# Patient Record
Sex: FEMALE | HISPANIC OR LATINO | URBAN - METROPOLITAN AREA
[De-identification: names, ages, dates, MRNs, and addresses within clinical notes are randomized per-mention and may not be internally consistent; named-entity substitution may affect disease eponyms.]

---

## 2018-10-31 ENCOUNTER — OFFICE VISIT (OUTPATIENT)
Dept: URBAN - METROPOLITAN AREA CLINIC 11 | Facility: CLINIC | Age: 58
End: 2018-10-31

## 2018-10-31 DIAGNOSIS — H52.4 PRESBYOPIA: Primary | ICD-10-CM

## 2018-10-31 PROCEDURE — 92002 INTRM OPH EXAM NEW PATIENT: CPT | Performed by: OPTOMETRIST

## 2018-10-31 ASSESSMENT — VISUAL ACUITY
OD: 20/20
OS: 20/20

## 2018-10-31 ASSESSMENT — INTRAOCULAR PRESSURE
OD: 17
OS: 16

## 2019-10-18 ENCOUNTER — OFFICE VISIT (OUTPATIENT)
Dept: FAMILY MEDICINE CLINIC | Facility: CLINIC | Age: 59
End: 2019-10-18
Payer: COMMERCIAL

## 2019-10-18 VITALS
HEART RATE: 76 BPM | BODY MASS INDEX: 39.37 KG/M2 | WEIGHT: 225 LBS | OXYGEN SATURATION: 98 % | TEMPERATURE: 98 F | DIASTOLIC BLOOD PRESSURE: 74 MMHG | SYSTOLIC BLOOD PRESSURE: 134 MMHG | HEIGHT: 63.5 IN

## 2019-10-18 DIAGNOSIS — J01.20 ACUTE NON-RECURRENT ETHMOIDAL SINUSITIS: Primary | ICD-10-CM

## 2019-10-18 DIAGNOSIS — Z12.39 BREAST CANCER SCREENING: ICD-10-CM

## 2019-10-18 DIAGNOSIS — J20.9 ACUTE BRONCHITIS, UNSPECIFIED ORGANISM: ICD-10-CM

## 2019-10-18 DIAGNOSIS — Z00.00 LABORATORY EXAMINATION ORDERED AS PART OF A ROUTINE GENERAL MEDICAL EXAMINATION: ICD-10-CM

## 2019-10-18 DIAGNOSIS — Z98.890 HISTORY OF RIGHT BREAST BIOPSY: ICD-10-CM

## 2019-10-18 DIAGNOSIS — F17.200 SMOKER: ICD-10-CM

## 2019-10-18 PROCEDURE — 99213 OFFICE O/P EST LOW 20 MIN: CPT | Performed by: FAMILY MEDICINE

## 2019-10-18 RX ORDER — PROMETHAZINE HYDROCHLORIDE AND CODEINE PHOSPHATE 6.25; 1 MG/5ML; MG/5ML
5 SYRUP ORAL EVERY 4 HOURS PRN
Qty: 240 ML | Refills: 0 | Status: SHIPPED | OUTPATIENT
Start: 2019-10-18 | End: 2020-06-22

## 2019-10-18 RX ORDER — AZITHROMYCIN 250 MG/1
TABLET, FILM COATED ORAL
Qty: 6 TABLET | Refills: 0 | Status: SHIPPED | OUTPATIENT
Start: 2019-10-18 | End: 2019-10-23

## 2019-10-18 NOTE — PROGRESS NOTES
HPI:   Saba Ramey is a 66year old female who presents for upper respiratory symptoms for  17  days. Patient reports dry cough, prior history of bronchitis, denies fever, denies sinus pain.   Smoker non compliant with follow ups has feelings of apprehe denies GI symptoms  NEURO: denies headache/weakness    EXAM:   /74 (BP Location: Right arm, Patient Position: Sitting, Cuff Size: adult)   Pulse 76   Temp 98.3 °F (36.8 °C) (Oral)   Ht 63.5\"   Wt 225 lb (102.1 kg)   LMP 01/01/2016 (Within Months) Take 5 mL by mouth every 4 (four) hours as needed for cough. Dispense: 240 mL; Refill: 0    3. Laboratory examination ordered as part of a routine general medical examination  - CBC WITH DIFFERENTIAL WITH PLATELET;  Future  - COMP METABOLIC PANEL (14); Fut them.  Track your triggers  What gives you that “N-mpmz-q-cigarette” feeling? List all the situations that make you want a cigarette. Then think of other ways to deal with these situations.  Here are some examples:  Situation How I'll handle it   Finishing

## 2019-10-19 ENCOUNTER — LAB ENCOUNTER (OUTPATIENT)
Dept: LAB | Age: 59
End: 2019-10-19
Attending: FAMILY MEDICINE
Payer: COMMERCIAL

## 2019-10-19 DIAGNOSIS — Z00.00 LABORATORY EXAMINATION ORDERED AS PART OF A ROUTINE GENERAL MEDICAL EXAMINATION: ICD-10-CM

## 2019-10-19 PROCEDURE — 80061 LIPID PANEL: CPT | Performed by: FAMILY MEDICINE

## 2019-10-19 PROCEDURE — 36415 COLL VENOUS BLD VENIPUNCTURE: CPT | Performed by: FAMILY MEDICINE

## 2019-10-19 PROCEDURE — 80050 GENERAL HEALTH PANEL: CPT | Performed by: FAMILY MEDICINE

## 2019-10-29 ENCOUNTER — OFFICE VISIT (OUTPATIENT)
Dept: FAMILY MEDICINE CLINIC | Facility: CLINIC | Age: 59
End: 2019-10-29
Payer: COMMERCIAL

## 2019-10-29 VITALS
HEART RATE: 84 BPM | HEIGHT: 63.5 IN | DIASTOLIC BLOOD PRESSURE: 78 MMHG | WEIGHT: 225 LBS | SYSTOLIC BLOOD PRESSURE: 102 MMHG | BODY MASS INDEX: 39.37 KG/M2

## 2019-10-29 DIAGNOSIS — D05.12 DUCTAL CARCINOMA IN SITU (DCIS) OF BOTH BREASTS: ICD-10-CM

## 2019-10-29 DIAGNOSIS — F17.200 SMOKER: ICD-10-CM

## 2019-10-29 DIAGNOSIS — Z12.11 COLON CANCER SCREENING: ICD-10-CM

## 2019-10-29 DIAGNOSIS — E66.9 OBESITY (BMI 35.0-39.9 WITHOUT COMORBIDITY): ICD-10-CM

## 2019-10-29 DIAGNOSIS — D05.11 DUCTAL CARCINOMA IN SITU (DCIS) OF BOTH BREASTS: ICD-10-CM

## 2019-10-29 DIAGNOSIS — Z01.419 WELL FEMALE EXAM WITH ROUTINE GYNECOLOGICAL EXAM: Primary | ICD-10-CM

## 2019-10-29 DIAGNOSIS — Z12.4 SCREENING FOR MALIGNANT NEOPLASM OF CERVIX: ICD-10-CM

## 2019-10-29 DIAGNOSIS — Z13.820 OSTEOPOROSIS SCREENING: ICD-10-CM

## 2019-10-29 DIAGNOSIS — Z98.890 HISTORY OF RIGHT BREAST BIOPSY: ICD-10-CM

## 2019-10-29 PROCEDURE — 88175 CYTOPATH C/V AUTO FLUID REDO: CPT | Performed by: FAMILY MEDICINE

## 2019-10-29 PROCEDURE — 99396 PREV VISIT EST AGE 40-64: CPT | Performed by: FAMILY MEDICINE

## 2019-10-29 PROCEDURE — 87624 HPV HI-RISK TYP POOLED RSLT: CPT | Performed by: FAMILY MEDICINE

## 2019-10-29 NOTE — PROGRESS NOTES
HPI:   Jose G Sidhu is a 66year old female who presents for a complete physical exam.  Here to review labs. Symptoms: denies discharge, itching, burning or dysuria. 4-5 years ago Menopause.    Cough and upper respiratory infection symptoms have resolve    PLAN: I reviewed both breast conservation, which would include excision of both of these areas, versus mastectomy with reconstruction.  I suggested that we obtain a consultation with the genetic counselor at the [address] [place] [address] 0.1 - 2.0 mg/dL    Total Protein 7.0 6.4 - 8.2 g/dL    Albumin 3.4 3.4 - 5.0 g/dL    Globulin  3.6 2.8 - 4.4 g/dL    A/G Ratio 0.9 (L) 1.0 - 2.0   CBC W/ DIFFERENTIAL   Result Value Ref Range    WBC 5.5 4.0 - 11.0 x10(3) uL    RBC 4.08 3.80 - 5.30 x10(6)uL any unusual skin lesions or rashes  EYES:denies vision changes  HEENT: denies upper respiratory symptoms  LUNGS: denies cough or shortness of breath with exertion  CHEST:  denies breast changes or pain  CARDIOVASCULAR: denies chest pain or tightness on exe for a complete physical exam.   Well female exam with routine gynecological exam  (primary encounter diagnosis)  Smoker  History of right breast biopsy  Colon cancer screening  Osteoporosis screening  Screening for malignant neoplasm of cervix  Obesity (bm patient did not verbalize that she had breast cancer during office visit she stated that her biopsy was normal upon review of results from 2016 with Dr. Juani Ballard it did show DCIS patient will be notified of this test results and referred to [facility][note 3]

## 2019-10-30 ENCOUNTER — TELEPHONE (OUTPATIENT)
Dept: FAMILY MEDICINE CLINIC | Facility: CLINIC | Age: 59
End: 2019-10-30

## 2019-10-30 ENCOUNTER — TELEPHONE (OUTPATIENT)
Dept: SURGERY | Facility: CLINIC | Age: 59
End: 2019-10-30

## 2019-10-30 NOTE — TELEPHONE ENCOUNTER
Received call from office of Gabby Reynolds. Advised to have patient get her diagnostic mammogram, and we will see her after that for management. Questions addressed.

## 2019-10-30 NOTE — TELEPHONE ENCOUNTER
Review of chart after patient's physical on 10/29/19 revealed patient did not follow up after having abnormal biopsy of bilateral breasts showing ductal carcinoma in situ in 2016.   Patient had been referred by the breast cancer coordinator to Dr. Michelle nash

## 2019-10-30 NOTE — TELEPHONE ENCOUNTER
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

## 2019-10-30 NOTE — TELEPHONE ENCOUNTER
Dot Hanna, the [facility], was able to offer aDnish Pratt an earlier appointment date on a Friday, which was one of her available days of the week, so the Mammogram was re-scheduled for:     Future Appointments   Date Time Provider Nayely Tapia

## 2019-11-08 ENCOUNTER — TELEPHONE (OUTPATIENT)
Dept: FAMILY MEDICINE CLINIC | Facility: CLINIC | Age: 59
End: 2019-11-08

## 2019-11-08 ENCOUNTER — TELEPHONE (OUTPATIENT)
Dept: SURGERY | Facility: CLINIC | Age: 59
End: 2019-11-08

## 2019-11-08 ENCOUNTER — HOSPITAL ENCOUNTER (OUTPATIENT)
Dept: ULTRASOUND IMAGING | Age: 59
Discharge: HOME | End: 2019-11-08
Attending: FAMILY MEDICINE
Payer: COMMERCIAL

## 2019-11-08 ENCOUNTER — HOSPITAL ENCOUNTER (OUTPATIENT)
Dept: MAMMOGRAPHY | Age: 59
Discharge: HOME | End: 2019-11-08
Attending: FAMILY MEDICINE
Payer: COMMERCIAL

## 2019-11-08 DIAGNOSIS — D05.11 DUCTAL CARCINOMA IN SITU (DCIS) OF BOTH BREASTS: ICD-10-CM

## 2019-11-08 DIAGNOSIS — D05.12 DUCTAL CARCINOMA IN SITU (DCIS) OF BOTH BREASTS: ICD-10-CM

## 2019-11-08 PROCEDURE — 76642 ULTRASOUND BREAST LIMITED: CPT | Performed by: FAMILY MEDICINE

## 2019-11-08 PROCEDURE — 77062 BREAST TOMOSYNTHESIS BI: CPT | Performed by: FAMILY MEDICINE

## 2019-11-08 PROCEDURE — 77066 DX MAMMO INCL CAD BI: CPT | Performed by: FAMILY MEDICINE

## 2019-11-08 NOTE — TELEPHONE ENCOUNTER
Ania from Dr Augustina Rodriguez office is calling to let Thom Morales know that she got a hold of Irina Churchill to let her know that they can get her in for a appt, but Irina Churchill is refusing the appt, Melissa Reyes did call Irina Churchill back to let her know that they will be holding a spot for h

## 2019-11-08 NOTE — IMAGING NOTE
This Breast Care RN spoke with Adore todd surgical consult for a 3 site bilateral ultrasound guided breast biopsy recommendation by Dr. Izzy Connor . Procedure reviewed and all questions answered.  On the day of the biopsy, pt instructed to take Tylen

## 2019-11-08 NOTE — TELEPHONE ENCOUNTER
I contacted the patient to offer an appt to see Dr Adrián Worley in consultation on Tuesday, after her mammogram which is today. Pt doesn't want to schedule at this time. Asked we call her on Monday.

## 2019-11-08 NOTE — TELEPHONE ENCOUNTER
Per Marcus Leyva PA-C, an attempt was made to contact the patient, but there was no answer and her voicemail was not set up.

## 2019-11-09 ENCOUNTER — PATIENT MESSAGE (OUTPATIENT)
Dept: FAMILY MEDICINE CLINIC | Facility: CLINIC | Age: 59
End: 2019-11-09

## 2019-11-09 NOTE — PROGRESS NOTES
Please call patient with test results on Monday she is already aware of the breast cancer I would like her to keep the appointment already established at [facility] with [address] for Tuesday.   Please explained that she would be the best person to buddy

## 2019-11-09 NOTE — TELEPHONE ENCOUNTER
Call patient Monday she needs to keep appointment due to abnormal mammogram I want her to be seen by oncology

## 2019-11-11 ENCOUNTER — TELEPHONE (OUTPATIENT)
Dept: FAMILY MEDICINE CLINIC | Facility: CLINIC | Age: 59
End: 2019-11-11

## 2019-11-11 NOTE — TELEPHONE ENCOUNTER
Per Allie Rushing PA-C, another message was left on the patient's voicemail asking her to call back to go over her recent results.

## 2019-11-11 NOTE — TELEPHONE ENCOUNTER
The patient was contacted regarding her Mammogram results from 11/08/2019.   It was explained that Malcolm Asif would like her to see Jocelyn Esparza M.D., a Surgical Oncologist, to discuss her history of DCIS in the right breast and recent mammogram results,

## 2019-11-11 NOTE — TELEPHONE ENCOUNTER
From: Miriam Bedoya  To: Missy Good PA-C  Sent: 11/9/2019 10:46 AM CST  Subject: Test Results Question    Dr John Paul Ring, when you have time, could you please call me @ [phone] regarding the mammogram Results ? I have a few questions too.   Thank

## 2019-11-11 NOTE — TELEPHONE ENCOUNTER
Per Jeanne Kat PA-C, a message was left on the patient's voicemail asking her to call back to go over her recent results.

## 2019-11-11 NOTE — TELEPHONE ENCOUNTER
Adrian Staff at Dr. Júnior Light office called back to inform us that they were able to schedule her an appointment for a consultation on:    Future Appointments   Date Time Provider Nayely Cantu   11/26/2019 12:00 PM Sherill Peabody, MD [facility] Surg

## 2019-11-11 NOTE — TELEPHONE ENCOUNTER
Jeffrey Fields from Dr. Guillen Filler office called and wants to speak to you regarding this pt. She can be reached at [phone].

## 2019-11-12 ENCOUNTER — TELEPHONE (OUTPATIENT)
Dept: SURGERY | Facility: CLINIC | Age: 59
End: 2019-11-12

## 2019-11-12 NOTE — TELEPHONE ENCOUNTER
LVM for patient identifying myself, and asked her to call me back, to go over a few things on her recent mammogram.   Confirmed that we are all set for her consultation on 11/26.

## 2019-11-26 ENCOUNTER — OFFICE VISIT (OUTPATIENT)
Dept: SURGERY | Facility: CLINIC | Age: 59
End: 2019-11-26
Payer: COMMERCIAL

## 2019-11-26 VITALS
HEART RATE: 76 BPM | OXYGEN SATURATION: 100 % | RESPIRATION RATE: 16 BRPM | DIASTOLIC BLOOD PRESSURE: 93 MMHG | SYSTOLIC BLOOD PRESSURE: 156 MMHG

## 2019-11-26 DIAGNOSIS — D05.11 NEOPLASM OF RIGHT BREAST, PRIMARY TUMOR STAGING CATEGORY TIS: DUCTAL CARCINOMA IN SITU (DCIS): ICD-10-CM

## 2019-11-26 DIAGNOSIS — R92.1 BREAST CALCIFICATION, RIGHT: Primary | ICD-10-CM

## 2019-11-26 DIAGNOSIS — N63.20 LEFT BREAST MASS: ICD-10-CM

## 2019-11-26 PROCEDURE — 99245 OFF/OP CONSLTJ NEW/EST HI 55: CPT | Performed by: SURGERY

## 2019-12-10 ENCOUNTER — HOSPITAL ENCOUNTER (OUTPATIENT)
Dept: MAMMOGRAPHY | Facility: HOSPITAL | Age: 59
Discharge: HOME | End: 2019-12-10
Attending: SURGERY
Payer: COMMERCIAL

## 2019-12-10 DIAGNOSIS — D05.11 NEOPLASM OF RIGHT BREAST, PRIMARY TUMOR STAGING CATEGORY TIS: DUCTAL CARCINOMA IN SITU (DCIS): ICD-10-CM

## 2019-12-10 DIAGNOSIS — N63.20 LEFT BREAST MASS: ICD-10-CM

## 2019-12-10 DIAGNOSIS — R92.1 BREAST CALCIFICATION, RIGHT: ICD-10-CM

## 2019-12-10 PROCEDURE — 88360 TUMOR IMMUNOHISTOCHEM/MANUAL: CPT | Performed by: SURGERY

## 2019-12-10 PROCEDURE — 19083 BX BREAST 1ST LESION US IMAG: CPT | Performed by: SURGERY

## 2019-12-10 PROCEDURE — 19082 BX BREAST ADD LESION STRTCTC: CPT | Performed by: SURGERY

## 2019-12-10 PROCEDURE — 88305 TISSUE EXAM BY PATHOLOGIST: CPT | Performed by: SURGERY

## 2019-12-10 PROCEDURE — 88342 IMHCHEM/IMCYTCHM 1ST ANTB: CPT | Performed by: SURGERY

## 2019-12-10 PROCEDURE — 19081 BX BREAST 1ST LESION STRTCTC: CPT | Performed by: SURGERY

## 2019-12-19 ENCOUNTER — TELEPHONE (OUTPATIENT)
Dept: SURGERY | Facility: CLINIC | Age: 59
End: 2019-12-19

## 2019-12-19 NOTE — TELEPHONE ENCOUNTER
I contacted the patient to review the message sent to Dr Keila Kimball regarding the recent biopsies. We discussed that invasive cancer cells were found, and we wanted to set up another appt to discuss surgical management. Pt verbalized understanding.    Pt as

## 2020-01-01 NOTE — PROGRESS NOTES
Breast Surgery New Patient Consultation    This is the first visit for this 64year old woman, referred by Akhil Killian, who presents for evaluation of R DCIS and abnormal L breast imaging.     History of Present Illness:   Ms. Alycia Madden is a 64 y Penicillin G            NAUSEA AND VOMITING    Family History:   Family History   Problem Relation Age of Onset   • Breast Cancer Mother [address] [facility]   • Breast Cancer Maternal Aunt         aunt, maternal side       She is not of Ashkenazi Gnosticism ancestry.     So to get up at night to urinate, urinary hesitancy or retaining urine, painful urination, urinary incontinence, decreased urine stream, blood in the urine or vaginal/penile discharge.     Skin:    The patient denies rash, itching, skin lesions, dry skin, nguyen symmetrical with a cup size 42D. Right breast: The skin, nipple ,and areola appear normal. There is no skin dimpling with movement of the pectoralis. There is no nipple retraction. No nipple discharge can be elicited. The parenchyma is mildly nodular.  The breast conservation is elected, I explained the need for free margins, the  possibility of re-excision to achieve free margins, and the need for post-operative radiotherapy.  The patient was told that antonio staging is not usually required for DCIS, but is s

## 2020-01-03 ENCOUNTER — OFFICE VISIT (OUTPATIENT)
Dept: SURGERY | Facility: CLINIC | Age: 60
End: 2020-01-03
Payer: COMMERCIAL

## 2020-01-03 VITALS
DIASTOLIC BLOOD PRESSURE: 84 MMHG | SYSTOLIC BLOOD PRESSURE: 140 MMHG | WEIGHT: 224.38 LBS | HEART RATE: 66 BPM | BODY MASS INDEX: 39 KG/M2 | TEMPERATURE: 97 F

## 2020-01-03 DIAGNOSIS — Z17.0 MALIGNANT NEOPLASM OF UPPER-INNER QUADRANT OF LEFT BREAST IN FEMALE, ESTROGEN RECEPTOR POSITIVE (HCC): ICD-10-CM

## 2020-01-03 DIAGNOSIS — C50.212 MALIGNANT NEOPLASM OF UPPER-INNER QUADRANT OF LEFT BREAST IN FEMALE, ESTROGEN RECEPTOR POSITIVE (HCC): ICD-10-CM

## 2020-01-03 DIAGNOSIS — D05.11 NEOPLASM OF RIGHT BREAST, PRIMARY TUMOR STAGING CATEGORY TIS: DUCTAL CARCINOMA IN SITU (DCIS): Primary | ICD-10-CM

## 2020-01-03 PROCEDURE — 99215 OFFICE O/P EST HI 40 MIN: CPT | Performed by: SURGERY

## 2020-01-04 NOTE — PROGRESS NOTES
Breast Surgery Surveillance    History of Present Illness:   Ms. Priscille Kehr is a 64year old woman who presents with a personal h/o R breast DCIS that was diagnosed in 2016 for which she chose not to undergo any management.  She states that she did not ago.  She denies infertility treatment to achieve pregnancy.     Medications:    No outpatient medications have been marked as taking for the 1/3/20 encounter (Office Visit) with Shani Moura MD.      Allergies:      Penicillin G            NAUSEA AND dark or bloody stools, constipation, yellowing of the skin, indigestion, nausea, change in bowel habits, diarrhea, abdominal pain or vomiting blood.      Genitourinary:  The patient denies frequent urination, needing to get up at night to urinate, urinary h trachea is in the midline. Conjunctiva are clear, non-icteric. Chest: The chest expands symmetrically. The lungs are clear to auscultation. Heart: The rhythm is regular. There are no murmurs, rubs, gallops or thrills.     Breasts:  Her breasts are sy of care for a multicentric disease is a mastectomy. The natural history and evolution of DCIS was discussed with Ms. Gideon Shone and her family.  This  included the difference between in-situ and invasive carcinoma, and the distinction between  local a described, including the technique, risks and benefits of sentinel node  biopsy, the possible need for axillary dissection, and the long-term sequelae of this procedure.   With regard to systemic therapy, final recommendation will be made following receipt appointment. This encounter lasted a total of 45 minutes, more than 50% of which was dedicated to discussion of management options.

## 2020-01-09 ENCOUNTER — NURSE NAVIGATOR ENCOUNTER (OUTPATIENT)
Dept: HEMATOLOGY/ONCOLOGY | Facility: HOSPITAL | Age: 60
End: 2020-01-09

## 2020-01-09 NOTE — PROGRESS NOTES
LVM for patient to introduce myself as the breast program navigator to ensure that she does not need any assistance at this time. Direct contact information provided.

## 2020-01-10 ENCOUNTER — TELEPHONE (OUTPATIENT)
Dept: SURGERY | Facility: CLINIC | Age: 60
End: 2020-01-10

## 2020-01-13 ENCOUNTER — TELEPHONE (OUTPATIENT)
Dept: SURGERY | Facility: CLINIC | Age: 60
End: 2020-01-13

## 2020-01-13 DIAGNOSIS — Z17.0 MALIGNANT NEOPLASM OF UPPER-INNER QUADRANT OF LEFT BREAST IN FEMALE, ESTROGEN RECEPTOR POSITIVE (HCC): ICD-10-CM

## 2020-01-13 DIAGNOSIS — C50.212 MALIGNANT NEOPLASM OF UPPER-INNER QUADRANT OF LEFT BREAST IN FEMALE, ESTROGEN RECEPTOR POSITIVE (HCC): ICD-10-CM

## 2020-01-13 DIAGNOSIS — D05.11 NEOPLASM OF RIGHT BREAST, PRIMARY TUMOR STAGING CATEGORY TIS: DUCTAL CARCINOMA IN SITU (DCIS): Primary | ICD-10-CM

## 2020-01-13 NOTE — TELEPHONE ENCOUNTER
Pt returned the call regarding scheduling her surgery. Pt wants a Thursday at [address] [facility] scheduled for Feb 6, 2020. Questions addressed regarding what to expect post op, and time off.    She has a trip end of Feb.   Questions addressed related to Health Care Proxy (HCP)

## 2020-01-24 RX ORDER — CLINDAMYCIN PHOSPHATE 900 MG/50ML
900 INJECTION INTRAVENOUS ONCE
Status: CANCELLED | OUTPATIENT
Start: 2020-01-24 | End: 2020-01-24

## 2020-02-05 NOTE — IMAGING NOTE
Phoned Saba Ramey regarding SLN mapping and needle localization process of breast for lumpectomy scheduled for 2/6 with Dr. Kim Foster. Procedure explained and all questions answered.  Pt to be transported via W/C through [facility] and [address] to [facility] in

## 2020-02-06 ENCOUNTER — HOSPITAL ENCOUNTER (OUTPATIENT)
Dept: NUCLEAR MEDICINE | Facility: HOSPITAL | Age: 60
Discharge: HOME | End: 2020-02-06
Attending: SURGERY
Payer: COMMERCIAL

## 2020-02-06 ENCOUNTER — ANESTHESIA EVENT (OUTPATIENT)
Dept: SURGERY | Facility: HOSPITAL | Age: 60
End: 2020-02-06
Payer: COMMERCIAL

## 2020-02-06 ENCOUNTER — HOSPITAL ENCOUNTER (OUTPATIENT)
Facility: HOSPITAL | Age: 60
Setting detail: HOSPITAL OUTPATIENT SURGERY
Discharge: HOME | End: 2020-02-06
Attending: SURGERY | Admitting: SURGERY
Payer: COMMERCIAL

## 2020-02-06 ENCOUNTER — ANESTHESIA (OUTPATIENT)
Dept: SURGERY | Facility: HOSPITAL | Age: 60
End: 2020-02-06
Payer: COMMERCIAL

## 2020-02-06 ENCOUNTER — HOSPITAL ENCOUNTER (OUTPATIENT)
Dept: MAMMOGRAPHY | Facility: HOSPITAL | Age: 60
Discharge: HOME | End: 2020-02-06
Attending: SURGERY
Payer: COMMERCIAL

## 2020-02-06 VITALS
DIASTOLIC BLOOD PRESSURE: 74 MMHG | RESPIRATION RATE: 14 BRPM | HEART RATE: 81 BPM | TEMPERATURE: 98 F | WEIGHT: 225.31 LBS | BODY MASS INDEX: 38.47 KG/M2 | OXYGEN SATURATION: 98 % | HEIGHT: 64 IN | SYSTOLIC BLOOD PRESSURE: 124 MMHG

## 2020-02-06 DIAGNOSIS — C50.212 MALIGNANT NEOPLASM OF UPPER-INNER QUADRANT OF LEFT BREAST IN FEMALE, ESTROGEN RECEPTOR POSITIVE (HCC): ICD-10-CM

## 2020-02-06 DIAGNOSIS — Z17.0 MALIGNANT NEOPLASM OF UPPER-INNER QUADRANT OF LEFT BREAST IN FEMALE, ESTROGEN RECEPTOR POSITIVE (HCC): ICD-10-CM

## 2020-02-06 DIAGNOSIS — D05.11 NEOPLASM OF RIGHT BREAST, PRIMARY TUMOR STAGING CATEGORY TIS: DUCTAL CARCINOMA IN SITU (DCIS): ICD-10-CM

## 2020-02-06 PROCEDURE — 76098 X-RAY EXAM SURGICAL SPECIMEN: CPT | Performed by: SURGERY

## 2020-02-06 PROCEDURE — 19281 PERQ DEVICE BREAST 1ST IMAG: CPT | Performed by: SURGERY

## 2020-02-06 PROCEDURE — 19282 PERQ DEVICE BREAST EA IMAG: CPT | Performed by: SURGERY

## 2020-02-06 PROCEDURE — 07B60ZX EXCISION OF LEFT AXILLARY LYMPHATIC, OPEN APPROACH, DIAGNOSTIC: ICD-10-PCS | Performed by: SURGERY

## 2020-02-06 PROCEDURE — 88305 TISSUE EXAM BY PATHOLOGIST: CPT | Performed by: SURGERY

## 2020-02-06 PROCEDURE — 36410 VNPNXR 3YR/> PHY/QHP DX/THER: CPT | Performed by: SURGERY

## 2020-02-06 PROCEDURE — 0HBV0ZZ EXCISION OF BILATERAL BREAST, OPEN APPROACH: ICD-10-PCS | Performed by: SURGERY

## 2020-02-06 PROCEDURE — S0077 INJECTION, CLINDAMYCIN PHOSP: HCPCS | Performed by: SURGERY

## 2020-02-06 PROCEDURE — 0HX5XZZ TRANSFER CHEST SKIN, EXTERNAL APPROACH: ICD-10-PCS | Performed by: SURGERY

## 2020-02-06 PROCEDURE — 88307 TISSUE EXAM BY PATHOLOGIST: CPT | Performed by: SURGERY

## 2020-02-06 PROCEDURE — 76937 US GUIDE VASCULAR ACCESS: CPT | Performed by: SURGERY

## 2020-02-06 PROCEDURE — 78195 LYMPH SYSTEM IMAGING: CPT | Performed by: SURGERY

## 2020-02-06 RX ORDER — HYDROCODONE BITARTRATE AND ACETAMINOPHEN 5; 325 MG/1; MG/1
2 TABLET ORAL AS NEEDED
Status: DISCONTINUED | OUTPATIENT
Start: 2020-02-06 | End: 2020-02-06

## 2020-02-06 RX ORDER — LIDOCAINE HYDROCHLORIDE 10 MG/ML
INJECTION, SOLUTION EPIDURAL; INFILTRATION; INTRACAUDAL; PERINEURAL AS NEEDED
Status: DISCONTINUED | OUTPATIENT
Start: 2020-02-06 | End: 2020-02-06 | Stop reason: SURG

## 2020-02-06 RX ORDER — LIDOCAINE HYDROCHLORIDE AND EPINEPHRINE 10; 10 MG/ML; UG/ML
INJECTION, SOLUTION INFILTRATION; PERINEURAL AS NEEDED
Status: DISCONTINUED | OUTPATIENT
Start: 2020-02-06 | End: 2020-02-06 | Stop reason: HOSPADM

## 2020-02-06 RX ORDER — EPHEDRINE SULFATE 50 MG/ML
INJECTION, SOLUTION INTRAVENOUS AS NEEDED
Status: DISCONTINUED | OUTPATIENT
Start: 2020-02-06 | End: 2020-02-06 | Stop reason: SURG

## 2020-02-06 RX ORDER — KETOROLAC TROMETHAMINE 30 MG/ML
INJECTION, SOLUTION INTRAMUSCULAR; INTRAVENOUS AS NEEDED
Status: DISCONTINUED | OUTPATIENT
Start: 2020-02-06 | End: 2020-02-06 | Stop reason: SURG

## 2020-02-06 RX ORDER — NALOXONE HYDROCHLORIDE 0.4 MG/ML
80 INJECTION, SOLUTION INTRAMUSCULAR; INTRAVENOUS; SUBCUTANEOUS AS NEEDED
Status: DISCONTINUED | OUTPATIENT
Start: 2020-02-06 | End: 2020-02-06

## 2020-02-06 RX ORDER — MIDAZOLAM HYDROCHLORIDE 1 MG/ML
INJECTION INTRAMUSCULAR; INTRAVENOUS AS NEEDED
Status: DISCONTINUED | OUTPATIENT
Start: 2020-02-06 | End: 2020-02-06 | Stop reason: SURG

## 2020-02-06 RX ORDER — LIDOCAINE AND PRILOCAINE 25; 25 MG/G; MG/G
CREAM TOPICAL ONCE
Status: COMPLETED | OUTPATIENT
Start: 2020-02-06 | End: 2020-02-06

## 2020-02-06 RX ORDER — ACETAMINOPHEN 500 MG
1000 TABLET ORAL ONCE
Status: DISCONTINUED | OUTPATIENT
Start: 2020-02-06 | End: 2020-02-06 | Stop reason: HOSPADM

## 2020-02-06 RX ORDER — HYDROCODONE BITARTRATE AND ACETAMINOPHEN 5; 325 MG/1; MG/1
1-2 TABLET ORAL EVERY 6 HOURS PRN
Qty: 40 TABLET | Refills: 0 | Status: SHIPPED | OUTPATIENT
Start: 2020-02-06 | End: 2020-03-24 | Stop reason: ALTCHOICE

## 2020-02-06 RX ORDER — BUPIVACAINE HYDROCHLORIDE 5 MG/ML
INJECTION, SOLUTION EPIDURAL; INTRACAUDAL AS NEEDED
Status: DISCONTINUED | OUTPATIENT
Start: 2020-02-06 | End: 2020-02-06 | Stop reason: HOSPADM

## 2020-02-06 RX ORDER — ACETAMINOPHEN 500 MG
1000 TABLET ORAL ONCE
COMMUNITY
End: 2020-03-24 | Stop reason: ALTCHOICE

## 2020-02-06 RX ORDER — SODIUM CHLORIDE, SODIUM LACTATE, POTASSIUM CHLORIDE, CALCIUM CHLORIDE 600; 310; 30; 20 MG/100ML; MG/100ML; MG/100ML; MG/100ML
INJECTION, SOLUTION INTRAVENOUS CONTINUOUS
Status: DISCONTINUED | OUTPATIENT
Start: 2020-02-06 | End: 2020-02-06

## 2020-02-06 RX ORDER — ONDANSETRON 2 MG/ML
INJECTION INTRAMUSCULAR; INTRAVENOUS AS NEEDED
Status: DISCONTINUED | OUTPATIENT
Start: 2020-02-06 | End: 2020-02-06 | Stop reason: SURG

## 2020-02-06 RX ORDER — HYDROMORPHONE HYDROCHLORIDE 1 MG/ML
0.4 INJECTION, SOLUTION INTRAMUSCULAR; INTRAVENOUS; SUBCUTANEOUS EVERY 5 MIN PRN
Status: DISCONTINUED | OUTPATIENT
Start: 2020-02-06 | End: 2020-02-06

## 2020-02-06 RX ORDER — DIAZEPAM 5 MG/1
5 TABLET ORAL AS NEEDED
Status: DISCONTINUED | OUTPATIENT
Start: 2020-02-06 | End: 2020-02-06 | Stop reason: HOSPADM

## 2020-02-06 RX ORDER — HYDROCODONE BITARTRATE AND ACETAMINOPHEN 5; 325 MG/1; MG/1
1 TABLET ORAL AS NEEDED
Status: DISCONTINUED | OUTPATIENT
Start: 2020-02-06 | End: 2020-02-06

## 2020-02-06 RX ORDER — CLINDAMYCIN PHOSPHATE 600 MG/50ML
600 INJECTION INTRAVENOUS ONCE
Status: COMPLETED | OUTPATIENT
Start: 2020-02-06 | End: 2020-02-06

## 2020-02-06 RX ORDER — DEXAMETHASONE SODIUM PHOSPHATE 4 MG/ML
VIAL (ML) INJECTION AS NEEDED
Status: DISCONTINUED | OUTPATIENT
Start: 2020-02-06 | End: 2020-02-06 | Stop reason: SURG

## 2020-02-06 RX ORDER — ONDANSETRON 2 MG/ML
4 INJECTION INTRAMUSCULAR; INTRAVENOUS AS NEEDED
Status: DISCONTINUED | OUTPATIENT
Start: 2020-02-06 | End: 2020-02-06

## 2020-02-06 RX ADMIN — EPHEDRINE SULFATE 10 MG: 50 INJECTION, SOLUTION INTRAVENOUS at 12:23:00

## 2020-02-06 RX ADMIN — ONDANSETRON 4 MG: 2 INJECTION INTRAMUSCULAR; INTRAVENOUS at 13:46:00

## 2020-02-06 RX ADMIN — MIDAZOLAM HYDROCHLORIDE 2 MG: 1 INJECTION INTRAMUSCULAR; INTRAVENOUS at 12:09:00

## 2020-02-06 RX ADMIN — SODIUM CHLORIDE, SODIUM LACTATE, POTASSIUM CHLORIDE, CALCIUM CHLORIDE: 600; 310; 30; 20 INJECTION, SOLUTION INTRAVENOUS at 14:00:00

## 2020-02-06 RX ADMIN — LIDOCAINE HYDROCHLORIDE 50 MG: 10 INJECTION, SOLUTION EPIDURAL; INFILTRATION; INTRACAUDAL; PERINEURAL at 12:12:00

## 2020-02-06 RX ADMIN — CLINDAMYCIN PHOSPHATE 600 MG: 600 INJECTION INTRAVENOUS at 12:17:00

## 2020-02-06 RX ADMIN — EPHEDRINE SULFATE 10 MG: 50 INJECTION, SOLUTION INTRAVENOUS at 12:31:00

## 2020-02-06 RX ADMIN — SODIUM CHLORIDE, SODIUM LACTATE, POTASSIUM CHLORIDE, CALCIUM CHLORIDE: 600; 310; 30; 20 INJECTION, SOLUTION INTRAVENOUS at 12:59:00

## 2020-02-06 RX ADMIN — KETOROLAC TROMETHAMINE 30 MG: 30 INJECTION, SOLUTION INTRAMUSCULAR; INTRAVENOUS at 13:46:00

## 2020-02-06 RX ADMIN — DEXAMETHASONE SODIUM PHOSPHATE 4 MG: 4 MG/ML VIAL (ML) INJECTION at 12:12:00

## 2020-02-06 NOTE — H&P
History of Present Illness:   Ms. Saba Ramey is a 64year old woman who presents with a personal h/o R breast DCIS that was diagnosed in 2016 for which she chose not to undergo any management.  She states that she did not clearly understand the risk of denies infertility treatment to achieve pregnancy.     Medications:    No outpatient medications have been marked as taking for the 1/3/20 encounter (Office Visit) with Rosie Richardson MD.        Allergies:       Penicillin G            NAUSEA AND VOMIT reflux symptoms, vomiting, dark or bloody stools, constipation, yellowing of the skin, indigestion, nausea, change in bowel habits, diarrhea, abdominal pain or vomiting blood.      Genitourinary:  The patient denies frequent urination, needing to get up at There are no palpable masses. The trachea is in the midline. Conjunctiva are clear, non-icteric.     Chest: The chest expands symmetrically. The lungs are clear to auscultation.     Heart: The rhythm is regular.   There are no murmurs, rubs, gallops or thri now 4:00 positions. We discussed the standard of care for a multicentric disease is a mastectomy. The natural history and evolution of DCIS was discussed with Ms. Chelly Brigette and her family.  This  included the difference between in-situ and invasive radiotherapy. The approach  to antonio staging was also described, including the technique, risks and benefits of sentinel node  biopsy, the possible need for axillary dissection, and the long-term sequelae of this procedure.   With regard to systemic therapy with any questions or concerns prior to her next appointment.      Pre-op Diagnosis: Neoplasm of right breast, primary tumor staging category Tis: ductal carcinoma in situ (DCIS) [D05.11]  Malignant neoplasm of upper-inner quadrant of left breast in female,

## 2020-02-06 NOTE — ANESTHESIA POSTPROCEDURE EVALUATION
[address] Patient Status:  Hospital Outpatient Surgery   Age/Gender 64year old female MRN [id number]   [facility] SURGERY Attending Bryan Cruz MD   Hosp Day # 0 PCP Tonia Castle DO       Anesthesia Post-op

## 2020-02-06 NOTE — IMAGING NOTE
Assisted Dr. Fide Gaston with needle localization for breast for lumpectomy. Procedure explained and all questions answered. Pt verbalized understanding. Emotional support provided and pt tolerated procedure well with minimal discomfort.  Gauze dressing placed

## 2020-02-06 NOTE — BRIEF OP NOTE
Pre-Operative Diagnosis: Neoplasm of right breast, primary tumor staging category Tis: ductal carcinoma in situ (DCIS) [D05.11]  Malignant neoplasm of upper-inner quadrant of left breast in female, estrogen receptor positive ([facility] [address] [C50.212, Z17.0]     Post

## 2020-02-06 NOTE — ANESTHESIA PROCEDURE NOTES
Airway  Date/Time: 2/6/2020 12:29 PM  Urgency: elective      General Information and Staff    Patient location during procedure: OR  Anesthesiologist: Kenya Ford MD  Resident/CRNA: Jose Cardenas CRNA  Performed: CRNA     Indications and Patient Condi

## 2020-02-06 NOTE — ANESTHESIA PREPROCEDURE EVALUATION
PRE-OP EVALUATION    Patient Name: Marnie Glass    Pre-op Diagnosis: Neoplasm of right breast, primary tumor staging category Tis: ductal carcinoma in situ (DCIS) [D05.11]  Malignant neoplasm of upper-inner quadrant of left breast in female, estrogen re Cigarettes        Start date: 11/21/1978      Smokeless tobacco: Never Used      Tobacco comment: less than a half a pack    Alcohol use: No      Alcohol/week: 0.0 standard drinks      Drug use: No     Available pre-op labs reviewed.                Airway

## 2020-02-07 NOTE — OPERATIVE REPORT
[address]    PATIENT'S NAME: Kimberly Lewis   ATTENDING PHYSICIAN: Jermaine Alcantara M.D. OPERATING PHYSICIAN: Jermaine Alcantara M.D.    PATIENT ACCOUNT#:   [de-identified]    [facility]  [facility] ED [address] #:   [id number] achieve free margins, the possibility of re-excision to achieve free margins, as well as the need for antonio interrogation, the approach to sentinel node biopsy including the technique and possible long-term sequelae, were discussed with the patient.   We di incision site. A transverse incision was made with a 15-blade knife at the inferior aspect of the axillary hairline after confirming uptake with a hand-held gamma counter.   Sharp dissection and electrocautery used to dissect through various layers of the and sent for permanent pathologic evaluation. Her cavity was then irrigated with warm sterile saline and hemostasis assured with electrocautery. Hemoclips were placed around the periphery of the cavity to assist in subsequent radiation planning.   She was Each were marked with a clip at the true margin. They were individually labeled and sent for permanent pathological evaluation. Her cavity was then irrigated with warm sterile saline. Hemostasis assured with electrocautery.   Hemoclips were placed around

## 2020-02-21 ENCOUNTER — OFFICE VISIT (OUTPATIENT)
Dept: SURGERY | Facility: CLINIC | Age: 60
End: 2020-02-21
Payer: COMMERCIAL

## 2020-02-21 ENCOUNTER — NURSE NAVIGATOR ENCOUNTER (OUTPATIENT)
Dept: HEMATOLOGY/ONCOLOGY | Facility: HOSPITAL | Age: 60
End: 2020-02-21

## 2020-02-21 VITALS
DIASTOLIC BLOOD PRESSURE: 85 MMHG | OXYGEN SATURATION: 98 % | HEART RATE: 92 BPM | SYSTOLIC BLOOD PRESSURE: 130 MMHG | RESPIRATION RATE: 18 BRPM

## 2020-02-21 DIAGNOSIS — C50.212 MALIGNANT NEOPLASM OF UPPER-INNER QUADRANT OF LEFT BREAST IN FEMALE, ESTROGEN RECEPTOR POSITIVE (HCC): ICD-10-CM

## 2020-02-21 DIAGNOSIS — Z17.0 MALIGNANT NEOPLASM OF UPPER-INNER QUADRANT OF LEFT BREAST IN FEMALE, ESTROGEN RECEPTOR POSITIVE (HCC): ICD-10-CM

## 2020-02-21 DIAGNOSIS — D05.11 NEOPLASM OF RIGHT BREAST, PRIMARY TUMOR STAGING CATEGORY TIS: DUCTAL CARCINOMA IN SITU (DCIS): Primary | ICD-10-CM

## 2020-02-21 PROCEDURE — 99024 POSTOP FOLLOW-UP VISIT: CPT | Performed by: SURGERY

## 2020-02-21 NOTE — PROGRESS NOTES
Spoke with patient regarding plan of care and that she needs to meet with medical and radiation oncology. Pt does not have calendar in front of her. She is going to phone navigator back on Monday to discuss appointments. Contact information provided.  She w

## 2020-02-27 ENCOUNTER — NURSE NAVIGATOR ENCOUNTER (OUTPATIENT)
Dept: HEMATOLOGY/ONCOLOGY | Facility: HOSPITAL | Age: 60
End: 2020-02-27

## 2020-02-27 NOTE — PROGRESS NOTES
Spoke with patient regarding appointments. She asked if her appointment with Dr. Myron Emerson on 3/13 will be her last one with her, we discussed that I cannot say as this will be based on healing. We again discussed the roles of medical and radiation oncology.

## 2020-03-13 ENCOUNTER — NURSE ONLY (OUTPATIENT)
Dept: SURGERY | Facility: CLINIC | Age: 60
End: 2020-03-13
Payer: COMMERCIAL

## 2020-03-13 VITALS
OXYGEN SATURATION: 98 % | TEMPERATURE: 98 F | RESPIRATION RATE: 18 BRPM | SYSTOLIC BLOOD PRESSURE: 125 MMHG | DIASTOLIC BLOOD PRESSURE: 80 MMHG | HEART RATE: 78 BPM

## 2020-03-13 NOTE — PROGRESS NOTES
Pt came in for Nurse visit for a 2nd post-op visit. No new breast complaints. Pt states she has no pain. Stopped taking norco for pain a few weeks ago and takes tylenol occasionally for headaches only.   She reports no signs of infection (ie fevers, luciano

## 2020-03-23 NOTE — PROGRESS NOTES
Breast Surgery Post-Operative Visit    Diagnosis: Left breast invasive ductal carcinoma status post lumpectomy and sentinel lymph node biopsy and well as right breast DCIS status post lumpectomy February 6, 2020.     Stage:   Left–T1b N0 MX  Right–Tis NX MX tunnel syndrome        Past Surgical History:   Procedure Laterality Date   • BREAST SENTINEL LYMPH NODE BIOPSY Bilateral 2/6/2020    Performed by Georgina Quintero MD at [facility] MAIN OR   • DESIRAE BIOPSY STEREO NODULE 1 SITE RIGHT (CPT=19081) Right 09/2016    DC cough, phlegm, hemoptysis, pleurisy/chest pain, pneumonia, asthma, wheezing, difficulty in breathing with exertion, emphysema, chronic bronchitis, shortness of breath or abnormal sound when breathing. Cardiovascular:   There is no history of chest pain, Allergic/Immunologic:  There is no history of hives, hay fever, angioedema or anaphylaxis.     /85 (BP Location: Left arm, Patient Position: Sitting, Cuff Size: adult)   Pulse 92   Resp 18   LMP  (LMP Unknown)   SpO2 98%     Physical Examination:

## 2020-03-23 NOTE — CONSULTS
Cancer Center Report of Consultation    Patient Name: Tracey Juarez   YOB: 1960   Medical Record Number: [id number]   CSN: [id number]   Consulting Physician: Art Rodriguez MD  Referring Physician(s): José Antonio Lynn  Date of Consultatio No visual disturbance, irritation and redness. Ears, nose, mouth, throat, and face: No hearing loss, tinnitus, hoarseness and voice change.   Respiratory: No cough, sputum, hemoptysis, chest pain, wheezing, dyspnea on exertion  Cardiovascular: No chest pepe AI. Zach baseline DEXA. # R breast DCIS: Pathology-multiple foci of DCIS, largest measuring 1.3 cm, margins negative, 100% ER, 90% WA. Zach RT and AI.     We discussed side effects of AI which include but are not limited to vasomotor symptoms, arthralgi

## 2020-03-24 ENCOUNTER — HOSPITAL ENCOUNTER (OUTPATIENT)
Dept: RADIATION ONCOLOGY | Facility: HOSPITAL | Age: 60
Discharge: HOME | End: 2020-03-24
Attending: RADIOLOGY
Payer: COMMERCIAL

## 2020-03-24 ENCOUNTER — OFFICE VISIT (OUTPATIENT)
Dept: HEMATOLOGY/ONCOLOGY | Facility: HOSPITAL | Age: 60
End: 2020-03-24
Attending: INTERNAL MEDICINE
Payer: COMMERCIAL

## 2020-03-24 VITALS
WEIGHT: 221 LBS | DIASTOLIC BLOOD PRESSURE: 77 MMHG | HEIGHT: 64 IN | RESPIRATION RATE: 16 BRPM | BODY MASS INDEX: 37.73 KG/M2 | HEART RATE: 68 BPM | TEMPERATURE: 97 F | SYSTOLIC BLOOD PRESSURE: 153 MMHG | OXYGEN SATURATION: 98 %

## 2020-03-24 VITALS
DIASTOLIC BLOOD PRESSURE: 83 MMHG | TEMPERATURE: 98 F | OXYGEN SATURATION: 97 % | HEART RATE: 82 BPM | WEIGHT: 221 LBS | BODY MASS INDEX: 38 KG/M2 | RESPIRATION RATE: 18 BRPM | SYSTOLIC BLOOD PRESSURE: 159 MMHG

## 2020-03-24 DIAGNOSIS — D05.11 DUCTAL CARCINOMA IN SITU (DCIS) OF RIGHT BREAST: ICD-10-CM

## 2020-03-24 DIAGNOSIS — C50.212 MALIGNANT NEOPLASM OF UPPER-INNER QUADRANT OF LEFT BREAST IN FEMALE, ESTROGEN RECEPTOR POSITIVE (HCC): Primary | ICD-10-CM

## 2020-03-24 DIAGNOSIS — Z17.0 BREAST CANCER, STAGE 1, ESTROGEN RECEPTOR POSITIVE, LEFT (HCC): ICD-10-CM

## 2020-03-24 DIAGNOSIS — Z17.0 MALIGNANT NEOPLASM OF UPPER-INNER QUADRANT OF LEFT BREAST IN FEMALE, ESTROGEN RECEPTOR POSITIVE (HCC): Primary | ICD-10-CM

## 2020-03-24 DIAGNOSIS — Z78.0 POSTMENOPAUSAL: Primary | ICD-10-CM

## 2020-03-24 DIAGNOSIS — C50.912 BREAST CANCER, STAGE 1, ESTROGEN RECEPTOR POSITIVE, LEFT (HCC): ICD-10-CM

## 2020-03-24 PROCEDURE — 99214 OFFICE O/P EST MOD 30 MIN: CPT

## 2020-03-24 PROCEDURE — 99245 OFF/OP CONSLTJ NEW/EST HI 55: CPT | Performed by: INTERNAL MEDICINE

## 2020-03-24 NOTE — PROGRESS NOTES
Nursing Consultation Note  Patient: Miriam Chávez  YOB: 1960  Age: 64year old  Radiation Oncologist: Dr. Chio Gillette  Referring Physician: [email or web address]  Consult Date: 3/24/2020      Chemotherapy: n/a  Labs: Rev aunt-reports she has not had any genetic testing to date. Reports she is a current every day smoker, \"less than half a pack a day\". Review of Systems   Constitutional: Negative. HENT: Negative. Eyes: Negative. Respiratory: Negative.     Israel Andrade Transportation needs:        Medical: Not on file        Non-medical: Not on file    Tobacco Use      Smoking status: Current Every Day Smoker        Packs/day: 0.30        Types: Cigarettes        Start date: 11/21/1978      Smokeless tobacco: Never Used patient knowledge level  Assess knowledge needs  Instruct on treatment planning  Instruct on purpose of radiation therapy    Expected Outcomes:  Knowledge of diagnosis  Knowledge of care plan  Knowledge of radiation therapy  Comfortable with knowledge red

## 2020-03-24 NOTE — PATIENT INSTRUCTIONS
- See medical oncologist/Dr Amberly Brown as scheduled today    -Pending her recommendations you will be directed back to our office at the appropriate time for radiation, & we will schedule your planning or \"mapping\" scan      - IF YOU HAVE ANY QUESTIONS OR CO

## 2020-03-24 NOTE — PROGRESS NOTES
Education Record    Learner:  Patient    Disease / Diagnosis:  Plan of care  Barriers / Limitations:  None   Comments:    Method:  Discussion   Comments:    General Topics:  Plan of care reviewed   Comments:    Outcome:  Shows understanding   Comments:

## 2020-03-24 NOTE — CONSULTS
[facility]  CONSULTATION     PATIENT:   Rustam Douglas      11/21/1960    REFERRING MD:  MD Tammi Santana MD  DIAGNOSIS:   R breast DCIS      L breast IDC, UIQ, ER+ Her2-   CC:    Breast cancer    HPI node biopsy were done.     A.  Left breast, 9:00, needle localization lumpectomy:  -Predominantly fatty breast tissue with mild usual ductal hyperplasia, few microcysts, and rare microcalcifications.  -Negative for carcinoma.  -No definitive biopsy site georgie excision:  -Benign breast tissue.      O. Left sentinel lymph node, excision:  -One lymph node, negative for carcinoma (0/1). Doing well postoperatively.     PMH   Breast disease as above    PSH   Breast operations as above    [address]   , works in [employer]    Duc Balderas MD  Radiation Oncology    CC: MD RAFAELA Valentine MD

## 2020-04-01 ENCOUNTER — HOSPITAL ENCOUNTER (OUTPATIENT)
Dept: RADIATION ONCOLOGY | Facility: HOSPITAL | Age: 60
Discharge: HOME | End: 2020-04-01
Attending: RADIOLOGY
Payer: COMMERCIAL

## 2020-04-07 ENCOUNTER — TELEPHONE (OUTPATIENT)
Dept: HEMATOLOGY/ONCOLOGY | Facility: HOSPITAL | Age: 60
End: 2020-04-07

## 2020-04-07 NOTE — TELEPHONE ENCOUNTER
LM for patient to call our office and schedule telephone appointment. Would like to start her on AI as she has elected to defer RT. DEXA ordered by PCP, she can wait until Covid-19 pandemic resolves before getting that done.

## 2020-04-08 ENCOUNTER — APPOINTMENT (OUTPATIENT)
Dept: RADIATION ONCOLOGY | Facility: HOSPITAL | Age: 60
End: 2020-04-08
Attending: RADIOLOGY
Payer: COMMERCIAL

## 2020-04-14 ENCOUNTER — TELEPHONE (OUTPATIENT)
Dept: RADIATION ONCOLOGY | Facility: HOSPITAL | Age: 60
End: 2020-04-14

## 2020-04-14 NOTE — TELEPHONE ENCOUNTER
TC to patient. Has not yet followed thru with med onc's advice. Advised she get started on hormone therapy. Discussed how long to delay RT. We think sim in mid May and start late May would work.    Not ideal, but delays are common during this time du

## 2020-05-15 ENCOUNTER — HOSPITAL ENCOUNTER (OUTPATIENT)
Dept: RADIATION ONCOLOGY | Facility: HOSPITAL | Age: 60
Discharge: HOME | End: 2020-05-15
Attending: RADIOLOGY
Payer: COMMERCIAL

## 2020-05-15 PROCEDURE — 77290 THER RAD SIMULAJ FIELD CPLX: CPT | Performed by: RADIOLOGY

## 2020-05-15 PROCEDURE — 77333 RADIATION TREATMENT AID(S): CPT | Performed by: RADIOLOGY

## 2020-05-26 PROCEDURE — 77334 RADIATION TREATMENT AID(S): CPT | Performed by: RADIOLOGY

## 2020-05-26 PROCEDURE — 77295 3-D RADIOTHERAPY PLAN: CPT | Performed by: RADIOLOGY

## 2020-05-26 PROCEDURE — 77300 RADIATION THERAPY DOSE PLAN: CPT | Performed by: RADIOLOGY

## 2020-05-26 PROCEDURE — 77470 SPECIAL RADIATION TREATMENT: CPT | Performed by: RADIOLOGY

## 2020-05-29 ENCOUNTER — HOSPITAL ENCOUNTER (OUTPATIENT)
Dept: RADIATION ONCOLOGY | Facility: HOSPITAL | Age: 60
Discharge: HOME | End: 2020-05-29
Attending: RADIOLOGY
Payer: COMMERCIAL

## 2020-05-29 PROCEDURE — 77280 THER RAD SIMULAJ FIELD SMPL: CPT | Performed by: RADIOLOGY

## 2020-06-01 ENCOUNTER — HOSPITAL ENCOUNTER (OUTPATIENT)
Dept: RADIATION ONCOLOGY | Facility: HOSPITAL | Age: 60
Discharge: HOME | End: 2020-06-01
Attending: RADIOLOGY
Payer: COMMERCIAL

## 2020-06-01 PROCEDURE — 77387 GUIDANCE FOR RADJ TX DLVR: CPT | Performed by: RADIOLOGY

## 2020-06-01 PROCEDURE — 77412 RADIATION TX DELIVERY LVL 3: CPT | Performed by: RADIOLOGY

## 2020-06-02 PROCEDURE — 77290 THER RAD SIMULAJ FIELD CPLX: CPT | Performed by: RADIOLOGY

## 2020-06-02 PROCEDURE — 77412 RADIATION TX DELIVERY LVL 3: CPT | Performed by: RADIOLOGY

## 2020-06-02 PROCEDURE — 77387 GUIDANCE FOR RADJ TX DLVR: CPT | Performed by: RADIOLOGY

## 2020-06-03 PROCEDURE — 77387 GUIDANCE FOR RADJ TX DLVR: CPT | Performed by: RADIOLOGY

## 2020-06-03 PROCEDURE — 77412 RADIATION TX DELIVERY LVL 3: CPT | Performed by: RADIOLOGY

## 2020-06-04 ENCOUNTER — HOSPITAL ENCOUNTER (OUTPATIENT)
Dept: RADIATION ONCOLOGY | Facility: HOSPITAL | Age: 60
Discharge: HOME | End: 2020-06-04
Attending: RADIOLOGY
Payer: COMMERCIAL

## 2020-06-04 VITALS
WEIGHT: 219 LBS | DIASTOLIC BLOOD PRESSURE: 71 MMHG | BODY MASS INDEX: 38 KG/M2 | OXYGEN SATURATION: 99 % | RESPIRATION RATE: 16 BRPM | HEART RATE: 60 BPM | TEMPERATURE: 98 F | SYSTOLIC BLOOD PRESSURE: 108 MMHG

## 2020-06-04 DIAGNOSIS — D05.11 DUCTAL CARCINOMA IN SITU (DCIS) OF RIGHT BREAST: Primary | ICD-10-CM

## 2020-06-04 DIAGNOSIS — C50.212 MALIGNANT NEOPLASM OF UPPER-INNER QUADRANT OF LEFT BREAST IN FEMALE, ESTROGEN RECEPTOR POSITIVE (HCC): ICD-10-CM

## 2020-06-04 DIAGNOSIS — Z17.0 MALIGNANT NEOPLASM OF UPPER-INNER QUADRANT OF LEFT BREAST IN FEMALE, ESTROGEN RECEPTOR POSITIVE (HCC): ICD-10-CM

## 2020-06-04 PROCEDURE — 77412 RADIATION TX DELIVERY LVL 3: CPT | Performed by: RADIOLOGY

## 2020-06-04 PROCEDURE — 77387 GUIDANCE FOR RADJ TX DLVR: CPT | Performed by: RADIOLOGY

## 2020-06-04 NOTE — PROGRESS NOTES
[facility] Radiation Treatment Management Note 1-5    Patient:  Farhana Costa  Age:  64year old  Visit Diagnosis:     R breast DCIS, ER/MT+   L breast IDC, pT1b N0 cM0, stage IA ER/MT+, HER2-  Primary Rad/Onc:  Dr. Kassie Daniel

## 2020-06-05 PROCEDURE — 77336 RADIATION PHYSICS CONSULT: CPT | Performed by: RADIOLOGY

## 2020-06-05 PROCEDURE — 77412 RADIATION TX DELIVERY LVL 3: CPT | Performed by: RADIOLOGY

## 2020-06-05 PROCEDURE — 77387 GUIDANCE FOR RADJ TX DLVR: CPT | Performed by: RADIOLOGY

## 2020-06-08 PROCEDURE — 77412 RADIATION TX DELIVERY LVL 3: CPT | Performed by: RADIOLOGY

## 2020-06-09 PROCEDURE — 77387 GUIDANCE FOR RADJ TX DLVR: CPT | Performed by: RADIOLOGY

## 2020-06-09 PROCEDURE — 77412 RADIATION TX DELIVERY LVL 3: CPT | Performed by: RADIOLOGY

## 2020-06-10 PROCEDURE — 77387 GUIDANCE FOR RADJ TX DLVR: CPT | Performed by: RADIOLOGY

## 2020-06-10 PROCEDURE — 77412 RADIATION TX DELIVERY LVL 3: CPT | Performed by: RADIOLOGY

## 2020-06-11 ENCOUNTER — HOSPITAL ENCOUNTER (OUTPATIENT)
Dept: RADIATION ONCOLOGY | Facility: HOSPITAL | Age: 60
Discharge: HOME | End: 2020-06-11
Attending: RADIOLOGY
Payer: COMMERCIAL

## 2020-06-11 VITALS
OXYGEN SATURATION: 99 % | DIASTOLIC BLOOD PRESSURE: 87 MMHG | RESPIRATION RATE: 18 BRPM | SYSTOLIC BLOOD PRESSURE: 150 MMHG | HEART RATE: 59 BPM | TEMPERATURE: 98 F | BODY MASS INDEX: 37 KG/M2 | WEIGHT: 217.81 LBS

## 2020-06-11 DIAGNOSIS — D05.11 DUCTAL CARCINOMA IN SITU (DCIS) OF RIGHT BREAST: Primary | ICD-10-CM

## 2020-06-11 DIAGNOSIS — C50.212 MALIGNANT NEOPLASM OF UPPER-INNER QUADRANT OF LEFT BREAST IN FEMALE, ESTROGEN RECEPTOR POSITIVE (HCC): ICD-10-CM

## 2020-06-11 DIAGNOSIS — Z17.0 MALIGNANT NEOPLASM OF UPPER-INNER QUADRANT OF LEFT BREAST IN FEMALE, ESTROGEN RECEPTOR POSITIVE (HCC): ICD-10-CM

## 2020-06-11 PROCEDURE — 77412 RADIATION TX DELIVERY LVL 3: CPT | Performed by: RADIOLOGY

## 2020-06-11 PROCEDURE — 77387 GUIDANCE FOR RADJ TX DLVR: CPT | Performed by: RADIOLOGY

## 2020-06-11 NOTE — PROGRESS NOTES
[facility] Radiation Treatment Management Note 6-10    Patient:  Krystle Ibrahim  Age:  64year old  Visit Diagnosis:     R breast DCIS, ER/KY+   L breast IDC, pT1b N0 cM0, stage IA ER/KY+, HER2-  Primary Rad/Onc:  Dr. Sandra Sneed

## 2020-06-12 ENCOUNTER — HOSPITAL ENCOUNTER (OUTPATIENT)
Dept: RADIATION ONCOLOGY | Facility: HOSPITAL | Age: 60
End: 2020-06-12
Attending: RADIOLOGY
Payer: COMMERCIAL

## 2020-06-12 PROCEDURE — 77336 RADIATION PHYSICS CONSULT: CPT | Performed by: RADIOLOGY

## 2020-06-12 PROCEDURE — 77387 GUIDANCE FOR RADJ TX DLVR: CPT | Performed by: RADIOLOGY

## 2020-06-12 PROCEDURE — 77412 RADIATION TX DELIVERY LVL 3: CPT | Performed by: RADIOLOGY

## 2020-06-15 ENCOUNTER — HOSPITAL ENCOUNTER (OUTPATIENT)
Dept: RADIATION ONCOLOGY | Facility: HOSPITAL | Age: 60
Discharge: HOME | End: 2020-06-15
Attending: RADIOLOGY
Payer: COMMERCIAL

## 2020-06-15 PROCEDURE — 77290 THER RAD SIMULAJ FIELD CPLX: CPT | Performed by: RADIOLOGY

## 2020-06-15 PROCEDURE — 77333 RADIATION TREATMENT AID(S): CPT | Performed by: RADIOLOGY

## 2020-06-15 PROCEDURE — 77412 RADIATION TX DELIVERY LVL 3: CPT | Performed by: RADIOLOGY

## 2020-06-16 PROCEDURE — 77412 RADIATION TX DELIVERY LVL 3: CPT | Performed by: RADIOLOGY

## 2020-06-16 PROCEDURE — 77387 GUIDANCE FOR RADJ TX DLVR: CPT | Performed by: RADIOLOGY

## 2020-06-17 PROCEDURE — 77290 THER RAD SIMULAJ FIELD CPLX: CPT | Performed by: RADIOLOGY

## 2020-06-17 PROCEDURE — 77295 3-D RADIOTHERAPY PLAN: CPT | Performed by: RADIOLOGY

## 2020-06-17 PROCEDURE — 77412 RADIATION TX DELIVERY LVL 3: CPT | Performed by: RADIOLOGY

## 2020-06-17 PROCEDURE — 77334 RADIATION TREATMENT AID(S): CPT | Performed by: RADIOLOGY

## 2020-06-17 PROCEDURE — 77300 RADIATION THERAPY DOSE PLAN: CPT | Performed by: RADIOLOGY

## 2020-06-18 ENCOUNTER — HOSPITAL ENCOUNTER (OUTPATIENT)
Dept: RADIATION ONCOLOGY | Facility: HOSPITAL | Age: 60
Discharge: HOME | End: 2020-06-18
Attending: RADIOLOGY
Payer: COMMERCIAL

## 2020-06-18 VITALS
OXYGEN SATURATION: 97 % | RESPIRATION RATE: 20 BRPM | WEIGHT: 217 LBS | TEMPERATURE: 98 F | HEART RATE: 57 BPM | BODY MASS INDEX: 37 KG/M2 | DIASTOLIC BLOOD PRESSURE: 81 MMHG | SYSTOLIC BLOOD PRESSURE: 132 MMHG

## 2020-06-18 DIAGNOSIS — C50.212 MALIGNANT NEOPLASM OF UPPER-INNER QUADRANT OF LEFT BREAST IN FEMALE, ESTROGEN RECEPTOR POSITIVE (HCC): ICD-10-CM

## 2020-06-18 DIAGNOSIS — D05.11 DUCTAL CARCINOMA IN SITU (DCIS) OF RIGHT BREAST: Primary | ICD-10-CM

## 2020-06-18 DIAGNOSIS — Z17.0 MALIGNANT NEOPLASM OF UPPER-INNER QUADRANT OF LEFT BREAST IN FEMALE, ESTROGEN RECEPTOR POSITIVE (HCC): ICD-10-CM

## 2020-06-18 PROCEDURE — 77387 GUIDANCE FOR RADJ TX DLVR: CPT | Performed by: RADIOLOGY

## 2020-06-18 PROCEDURE — 77412 RADIATION TX DELIVERY LVL 3: CPT | Performed by: RADIOLOGY

## 2020-06-18 NOTE — PROGRESS NOTES
[facility] Radiation Treatment Management Note 11-15    Patient:  Diane Velazquez  Age:  64year old  Visit Diagnosis:     R breast DCIS, ER/IL+   L breast IDC, pT1b N0 cM0, stage IA ER/IL+, HER2-  Primary Rad/Onc:  Dr. Nahid Franco

## 2020-06-19 ENCOUNTER — HOSPITAL ENCOUNTER (OUTPATIENT)
Dept: RADIATION ONCOLOGY | Facility: HOSPITAL | Age: 60
Discharge: HOME | End: 2020-06-19
Attending: RADIOLOGY
Payer: COMMERCIAL

## 2020-06-19 PROCEDURE — 77280 THER RAD SIMULAJ FIELD SMPL: CPT | Performed by: RADIOLOGY

## 2020-06-19 PROCEDURE — 77336 RADIATION PHYSICS CONSULT: CPT | Performed by: RADIOLOGY

## 2020-06-19 PROCEDURE — 77412 RADIATION TX DELIVERY LVL 3: CPT | Performed by: RADIOLOGY

## 2020-06-22 ENCOUNTER — VIRTUAL PHONE E/M (OUTPATIENT)
Dept: FAMILY MEDICINE CLINIC | Facility: CLINIC | Age: 60
End: 2020-06-22
Payer: COMMERCIAL

## 2020-06-22 ENCOUNTER — LAB ENCOUNTER (OUTPATIENT)
Dept: LAB | Facility: HOSPITAL | Age: 60
End: 2020-06-22
Attending: FAMILY MEDICINE
Payer: COMMERCIAL

## 2020-06-22 DIAGNOSIS — C50.911 BILATERAL MALIGNANT NEOPLASM OF BREAST IN FEMALE, ESTROGEN RECEPTOR POSITIVE, UNSPECIFIED SITE OF BREAST (HCC): ICD-10-CM

## 2020-06-22 DIAGNOSIS — R09.82 POST-NASAL DRAINAGE: ICD-10-CM

## 2020-06-22 DIAGNOSIS — C50.912 BILATERAL MALIGNANT NEOPLASM OF BREAST IN FEMALE, ESTROGEN RECEPTOR POSITIVE, UNSPECIFIED SITE OF BREAST (HCC): ICD-10-CM

## 2020-06-22 DIAGNOSIS — Z17.0 BILATERAL MALIGNANT NEOPLASM OF BREAST IN FEMALE, ESTROGEN RECEPTOR POSITIVE, UNSPECIFIED SITE OF BREAST (HCC): ICD-10-CM

## 2020-06-22 DIAGNOSIS — R05.9 COUGH: ICD-10-CM

## 2020-06-22 DIAGNOSIS — R05.9 COUGH: Primary | ICD-10-CM

## 2020-06-22 DIAGNOSIS — F17.200 SMOKER: ICD-10-CM

## 2020-06-22 DIAGNOSIS — J01.20 ACUTE NON-RECURRENT ETHMOIDAL SINUSITIS: ICD-10-CM

## 2020-06-22 PROCEDURE — 77387 GUIDANCE FOR RADJ TX DLVR: CPT | Performed by: RADIOLOGY

## 2020-06-22 PROCEDURE — 77412 RADIATION TX DELIVERY LVL 3: CPT | Performed by: RADIOLOGY

## 2020-06-22 PROCEDURE — 99214 OFFICE O/P EST MOD 30 MIN: CPT | Performed by: FAMILY MEDICINE

## 2020-06-22 RX ORDER — PROMETHAZINE HYDROCHLORIDE AND CODEINE PHOSPHATE 6.25; 1 MG/5ML; MG/5ML
5 SYRUP ORAL EVERY 4 HOURS PRN
Qty: 240 ML | Refills: 0 | Status: SHIPPED | OUTPATIENT
Start: 2020-06-22 | End: 2022-01-06

## 2020-06-22 NOTE — PROGRESS NOTES
Virtual Telephone Check-In    Chellykim Machuca verbally consents to a Virtual/Telephone Check-In visit on 06/22/20. Patient has been referred to the [facility] website at [email or web address] to review the yearly Consent to Treat document.     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 She does wear her mask and goes to work at least 2 times a week.   She feels that the cough is just secondary to the postnasal drainage and wants an antibiotic and cough syrup like she is gotten in the past.  She overal conversation is presently at work seems pleasant in a good mood  Psych patient's mood is normal and communication skills are good    ASSESSMENT AND PLAN:   Cough  (primary encounter diagnosis)  Post-nasal drainage  Smoker  Bilateral malignant neoplasm of b radiation to tell them you are going for COVID-19 testing hopefully today and that the radiation scheduled for tomorrow at 730 may need to be rescheduled.   Patient is a very low index for COVID-19 but secondary to her present treatment and cough would advi

## 2020-06-23 PROCEDURE — 77412 RADIATION TX DELIVERY LVL 3: CPT | Performed by: RADIOLOGY

## 2020-06-23 PROCEDURE — 77387 GUIDANCE FOR RADJ TX DLVR: CPT | Performed by: RADIOLOGY

## 2020-06-23 RX ORDER — AZITHROMYCIN 250 MG/1
TABLET, FILM COATED ORAL
Qty: 6 TABLET | Refills: 0 | Status: SHIPPED | OUTPATIENT
Start: 2020-06-23 | End: 2020-06-28

## 2020-06-25 ENCOUNTER — HOSPITAL ENCOUNTER (OUTPATIENT)
Dept: RADIATION ONCOLOGY | Facility: HOSPITAL | Age: 60
Discharge: HOME | End: 2020-06-25
Attending: RADIOLOGY
Payer: COMMERCIAL

## 2020-06-25 VITALS
DIASTOLIC BLOOD PRESSURE: 83 MMHG | HEART RATE: 62 BPM | BODY MASS INDEX: 37 KG/M2 | TEMPERATURE: 98 F | OXYGEN SATURATION: 97 % | RESPIRATION RATE: 16 BRPM | SYSTOLIC BLOOD PRESSURE: 135 MMHG | WEIGHT: 218.38 LBS

## 2020-06-25 DIAGNOSIS — Z17.0 MALIGNANT NEOPLASM OF UPPER-INNER QUADRANT OF LEFT BREAST IN FEMALE, ESTROGEN RECEPTOR POSITIVE (HCC): ICD-10-CM

## 2020-06-25 DIAGNOSIS — C50.212 MALIGNANT NEOPLASM OF UPPER-INNER QUADRANT OF LEFT BREAST IN FEMALE, ESTROGEN RECEPTOR POSITIVE (HCC): ICD-10-CM

## 2020-06-25 DIAGNOSIS — D05.11 DUCTAL CARCINOMA IN SITU (DCIS) OF RIGHT BREAST: Primary | ICD-10-CM

## 2020-06-25 PROCEDURE — 77412 RADIATION TX DELIVERY LVL 3: CPT | Performed by: RADIOLOGY

## 2020-06-25 PROCEDURE — 77387 GUIDANCE FOR RADJ TX DLVR: CPT | Performed by: RADIOLOGY

## 2020-06-25 NOTE — PATIENT INSTRUCTIONS
POST-RADIATION INSTRUCTIONS:   - CALL [phone] FOR A FOLLOW-UP WITH DR. VANESSA 3 MONTHS AFTER RADIATION COMPLETION  - FOLLOW-UP WITH DR. Charlie Guzman AFTER RADIATION COMPLETION  - FOLLOW-UP WITH DR. Angel Dean  6 MONTHS AFTER RADIATION COMPLETION, AFTER YOUR DESIRAE

## 2020-06-25 NOTE — PROGRESS NOTES
[facility] Radiation Treatment Management Note 16-20    Patient:  Jose G Sidhu  Age:  64year old  Visit Diagnosis:     R breast DCIS, ER/ME+   L breast IDC, pT1b N0 cM0, stage IA ER/ME+, HER2-  Primary Rad/Onc:  Dr. Desiree Snow

## 2020-06-26 PROCEDURE — 77387 GUIDANCE FOR RADJ TX DLVR: CPT | Performed by: RADIOLOGY

## 2020-06-26 PROCEDURE — 77336 RADIATION PHYSICS CONSULT: CPT | Performed by: RADIOLOGY

## 2020-06-26 PROCEDURE — 77412 RADIATION TX DELIVERY LVL 3: CPT | Performed by: RADIOLOGY

## 2020-06-29 ENCOUNTER — DOCUMENTATION ONLY (OUTPATIENT)
Dept: RADIATION ONCOLOGY | Facility: HOSPITAL | Age: 60
End: 2020-06-29

## 2020-06-29 PROCEDURE — 77412 RADIATION TX DELIVERY LVL 3: CPT | Performed by: RADIOLOGY

## 2020-06-29 PROCEDURE — 77387 GUIDANCE FOR RADJ TX DLVR: CPT | Performed by: RADIOLOGY

## 2020-07-01 ENCOUNTER — HOSPITAL ENCOUNTER (OUTPATIENT)
Dept: RADIATION ONCOLOGY | Facility: HOSPITAL | Age: 60
Discharge: HOME | End: 2020-07-01
Attending: RADIOLOGY
Payer: COMMERCIAL

## 2020-07-14 NOTE — PROGRESS NOTES
[facility]  TREATMENT SUMMARY     PATIENT:  Stan Lopez MD: Blank Che MD  DIAGNOSIS:  R breast DCIS     L breast cancer    HISTORY   63 yo woman s/p large volume lumpectomy for multicentric DCIS of R breast

## 2021-04-13 ENCOUNTER — IMMUNIZATION (OUTPATIENT)
Dept: LAB | Age: 61
End: 2021-04-13
Attending: HOSPITALIST
Payer: COMMERCIAL

## 2021-04-13 DIAGNOSIS — Z23 NEED FOR VACCINATION: Primary | ICD-10-CM

## 2021-04-13 PROCEDURE — 0001A SARSCOV2 VAC 30MCG/0.3ML IM: CPT

## 2021-05-04 ENCOUNTER — IMMUNIZATION (OUTPATIENT)
Dept: LAB | Age: 61
End: 2021-05-04
Attending: HOSPITALIST
Payer: COMMERCIAL

## 2021-05-04 DIAGNOSIS — Z23 NEED FOR VACCINATION: Primary | ICD-10-CM

## 2021-05-04 PROCEDURE — 0002A SARSCOV2 VAC 30MCG/0.3ML IM: CPT

## 2021-11-16 ENCOUNTER — HOSPITAL ENCOUNTER (EMERGENCY)
Facility: HOSPITAL | Age: 61
Discharge: HOME | End: 2021-11-17
Attending: EMERGENCY MEDICINE
Payer: COMMERCIAL

## 2021-11-16 ENCOUNTER — APPOINTMENT (OUTPATIENT)
Dept: CT IMAGING | Facility: HOSPITAL | Age: 61
End: 2021-11-16
Attending: EMERGENCY MEDICINE
Payer: COMMERCIAL

## 2021-11-16 ENCOUNTER — APPOINTMENT (OUTPATIENT)
Dept: GENERAL RADIOLOGY | Facility: HOSPITAL | Age: 61
End: 2021-11-16
Attending: EMERGENCY MEDICINE
Payer: COMMERCIAL

## 2021-11-16 VITALS
DIASTOLIC BLOOD PRESSURE: 82 MMHG | HEIGHT: 64 IN | TEMPERATURE: 98 F | HEART RATE: 60 BPM | WEIGHT: 180 LBS | SYSTOLIC BLOOD PRESSURE: 140 MMHG | OXYGEN SATURATION: 97 % | RESPIRATION RATE: 17 BRPM | BODY MASS INDEX: 30.73 KG/M2

## 2021-11-16 DIAGNOSIS — R55 SYNCOPE AND COLLAPSE: Primary | ICD-10-CM

## 2021-11-16 PROCEDURE — 81001 URINALYSIS AUTO W/SCOPE: CPT | Performed by: EMERGENCY MEDICINE

## 2021-11-16 PROCEDURE — 70450 CT HEAD/BRAIN W/O DYE: CPT | Performed by: EMERGENCY MEDICINE

## 2021-11-16 PROCEDURE — 85025 COMPLETE CBC W/AUTO DIFF WBC: CPT | Performed by: EMERGENCY MEDICINE

## 2021-11-16 PROCEDURE — 84484 ASSAY OF TROPONIN QUANT: CPT | Performed by: EMERGENCY MEDICINE

## 2021-11-16 PROCEDURE — 85025 COMPLETE CBC W/AUTO DIFF WBC: CPT

## 2021-11-16 PROCEDURE — 99285 EMERGENCY DEPT VISIT HI MDM: CPT | Performed by: EMERGENCY MEDICINE

## 2021-11-16 PROCEDURE — 87086 URINE CULTURE/COLONY COUNT: CPT | Performed by: EMERGENCY MEDICINE

## 2021-11-16 PROCEDURE — 80053 COMPREHEN METABOLIC PANEL: CPT | Performed by: EMERGENCY MEDICINE

## 2021-11-16 PROCEDURE — 99284 EMERGENCY DEPT VISIT MOD MDM: CPT

## 2021-11-16 PROCEDURE — 93005 ELECTROCARDIOGRAM TRACING: CPT

## 2021-11-16 PROCEDURE — 36415 COLL VENOUS BLD VENIPUNCTURE: CPT | Performed by: EMERGENCY MEDICINE

## 2021-11-16 PROCEDURE — 71045 X-RAY EXAM CHEST 1 VIEW: CPT | Performed by: EMERGENCY MEDICINE

## 2021-11-16 PROCEDURE — 80053 COMPREHEN METABOLIC PANEL: CPT

## 2021-11-16 PROCEDURE — 93010 ELECTROCARDIOGRAM REPORT: CPT

## 2021-11-16 NOTE — ED INITIAL ASSESSMENT (HPI)
Pt states she was in a store and then she felt warm. Pt states peoples voices felt distant. Pt states she sat on a garbage can. Pt states she then had a syncopal episode.

## 2021-11-17 NOTE — ED PROVIDER NOTES
Patient Seen in: [facility] Emergency Department      History   Patient presents with:  Syncope    Stated Complaint: syncopy yesterday     Subjective:   HPI    Patient with a history of smoking, hypertension, hyperlipidemia breast cancer in February Vaping Use      Vaping Use: Never used    Alcohol use: No      Alcohol/week: 0.0 standard drinks    Drug use: No             Review of Systems    Positive for stated complaint: syncopy yesterday   Other systems are as noted in HPI.   Constitutional and eddy General: No tenderness or deformity. Normal range of motion. Cervical back: Normal range of motion and neck supple. Right lower leg: Edema present. Left lower leg: Edema present.       Comments: Trace bilateral nonpitting edema     Lymphaden rate of 59 bpm no acute ST elevation ST depression to suggest acute ischemia. XR CHEST AP PORTABLE  (CPT=71045)   Final Result    PROCEDURE:  XR CHEST AP PORTABLE  (CPT=71045)         TECHNIQUE:  AP chest radiograph was obtained.          AYLA at 11:04 PM                 MDM      32-year-old patient with a history of hypertension hyperlipidemia breast cancer presents to the emergency department in the care of her  after syncopal episode yesterday at the mall.   She refused to come to the e [phone]  [phone]    Schedule an appointment as soon as possible for a visit            Medications Prescribed:  There are no discharge medications for this patient.

## 2021-12-15 ENCOUNTER — OFFICE VISIT (OUTPATIENT)
Dept: FAMILY MEDICINE CLINIC | Facility: CLINIC | Age: 61
End: 2021-12-15
Payer: COMMERCIAL

## 2021-12-15 VITALS
BODY MASS INDEX: 38.93 KG/M2 | RESPIRATION RATE: 18 BRPM | DIASTOLIC BLOOD PRESSURE: 74 MMHG | HEART RATE: 78 BPM | WEIGHT: 228 LBS | SYSTOLIC BLOOD PRESSURE: 126 MMHG | HEIGHT: 64 IN

## 2021-12-15 DIAGNOSIS — Z23 NEED FOR VACCINATION: ICD-10-CM

## 2021-12-15 DIAGNOSIS — Z85.3 HISTORY OF BILATERAL BREAST CANCER: ICD-10-CM

## 2021-12-15 DIAGNOSIS — F17.210 CIGARETTE SMOKER: ICD-10-CM

## 2021-12-15 DIAGNOSIS — Z87.891 PERSONAL HISTORY OF TOBACCO USE, PRESENTING HAZARDS TO HEALTH: ICD-10-CM

## 2021-12-15 DIAGNOSIS — Z00.00 ANNUAL PHYSICAL EXAM: Primary | ICD-10-CM

## 2021-12-15 DIAGNOSIS — Z12.2 SCREENING FOR LUNG CANCER: ICD-10-CM

## 2021-12-15 DIAGNOSIS — Z12.31 VISIT FOR SCREENING MAMMOGRAM: ICD-10-CM

## 2021-12-15 DIAGNOSIS — E66.01 SEVERE OBESITY (BMI 35.0-35.9 WITH COMORBIDITY) (HCC): ICD-10-CM

## 2021-12-15 PROBLEM — E66.9 OBESITY (BMI 35.0-39.9 WITHOUT COMORBIDITY): Status: RESOLVED | Noted: 2019-10-29 | Resolved: 2021-12-15

## 2021-12-15 PROCEDURE — 90715 TDAP VACCINE 7 YRS/> IM: CPT | Performed by: FAMILY MEDICINE

## 2021-12-15 PROCEDURE — 3008F BODY MASS INDEX DOCD: CPT | Performed by: FAMILY MEDICINE

## 2021-12-15 PROCEDURE — 90471 IMMUNIZATION ADMIN: CPT | Performed by: FAMILY MEDICINE

## 2021-12-15 PROCEDURE — 99396 PREV VISIT EST AGE 40-64: CPT | Performed by: FAMILY MEDICINE

## 2021-12-15 PROCEDURE — 3074F SYST BP LT 130 MM HG: CPT | Performed by: FAMILY MEDICINE

## 2021-12-15 PROCEDURE — 90732 PPSV23 VACC 2 YRS+ SUBQ/IM: CPT | Performed by: FAMILY MEDICINE

## 2021-12-15 PROCEDURE — 90472 IMMUNIZATION ADMIN EACH ADD: CPT | Performed by: FAMILY MEDICINE

## 2021-12-15 PROCEDURE — 99406 BEHAV CHNG SMOKING 3-10 MIN: CPT | Performed by: FAMILY MEDICINE

## 2021-12-15 PROCEDURE — 3078F DIAST BP <80 MM HG: CPT | Performed by: FAMILY MEDICINE

## 2022-01-06 ENCOUNTER — TELEMEDICINE (OUTPATIENT)
Dept: FAMILY MEDICINE CLINIC | Facility: CLINIC | Age: 62
End: 2022-01-06

## 2022-01-06 DIAGNOSIS — R09.82 POST-NASAL DRAINAGE: ICD-10-CM

## 2022-01-06 DIAGNOSIS — R05.9 COUGH: Primary | ICD-10-CM

## 2022-01-06 DIAGNOSIS — Z87.891 PERSONAL HISTORY OF TOBACCO USE, PRESENTING HAZARDS TO HEALTH: ICD-10-CM

## 2022-01-06 PROCEDURE — 99213 OFFICE O/P EST LOW 20 MIN: CPT | Performed by: PHYSICIAN ASSISTANT

## 2022-01-06 RX ORDER — AZITHROMYCIN 250 MG/1
TABLET, FILM COATED ORAL
Qty: 6 TABLET | Refills: 0 | Status: SHIPPED | OUTPATIENT
Start: 2022-01-06 | End: 2022-01-11

## 2022-01-06 RX ORDER — PROMETHAZINE HYDROCHLORIDE AND CODEINE PHOSPHATE 6.25; 1 MG/5ML; MG/5ML
5 SYRUP ORAL EVERY 4 HOURS PRN
Qty: 118 ML | Refills: 0 | Status: SHIPPED | OUTPATIENT
Start: 2022-01-06

## 2022-01-06 RX ORDER — ALBUTEROL SULFATE 90 UG/1
2 AEROSOL, METERED RESPIRATORY (INHALATION) EVERY 6 HOURS PRN
Qty: 6.7 G | Refills: 0 | Status: SHIPPED | OUTPATIENT
Start: 2022-01-06

## 2022-01-06 NOTE — PROGRESS NOTES
Telemedicine Visit     Virtual Video Check-In    Jenn Amin verbally consents to Video Check-In service on 1/6/22.  Jenn Amin understands and accepts financial responsibility for any deductible, co-insurance and/or co-pays associated with this se Neurological:      Mental Status: She is alert. Summary of topics discussed/ diagnosis:  1. Cough  - promethazine-codeine 6.25-10 MG/5ML Oral Syrup; Take 5 mL by mouth every 4 (four) hours as needed for cough. Dispense: 118 mL; Refill: 0    2.  Po

## 2022-03-29 ENCOUNTER — HOSPITAL ENCOUNTER (OUTPATIENT)
Dept: MAMMOGRAPHY | Facility: HOSPITAL | Age: 62
Discharge: HOME | End: 2022-03-29
Attending: FAMILY MEDICINE
Payer: COMMERCIAL

## 2022-03-29 DIAGNOSIS — Z12.31 VISIT FOR SCREENING MAMMOGRAM: ICD-10-CM

## 2022-03-29 DIAGNOSIS — Z85.3 HISTORY OF BILATERAL BREAST CANCER: ICD-10-CM

## 2022-03-29 PROCEDURE — 77067 SCR MAMMO BI INCL CAD: CPT | Performed by: FAMILY MEDICINE

## 2022-03-29 PROCEDURE — 77063 BREAST TOMOSYNTHESIS BI: CPT | Performed by: FAMILY MEDICINE

## 2022-04-19 ENCOUNTER — HOSPITAL ENCOUNTER (OUTPATIENT)
Dept: MAMMOGRAPHY | Facility: HOSPITAL | Age: 62
Discharge: HOME | End: 2022-04-19
Attending: FAMILY MEDICINE
Payer: COMMERCIAL

## 2022-04-19 DIAGNOSIS — R92.8 ABNORMAL MAMMOGRAM: ICD-10-CM

## 2022-04-19 PROCEDURE — 77062 BREAST TOMOSYNTHESIS BI: CPT | Performed by: FAMILY MEDICINE

## 2022-04-19 PROCEDURE — 77066 DX MAMMO INCL CAD BI: CPT | Performed by: FAMILY MEDICINE

## 2022-10-18 ENCOUNTER — OFFICE VISIT (OUTPATIENT)
Dept: FAMILY MEDICINE CLINIC | Facility: CLINIC | Age: 62
End: 2022-10-18
Payer: COMMERCIAL

## 2022-10-18 VITALS
DIASTOLIC BLOOD PRESSURE: 78 MMHG | RESPIRATION RATE: 16 BRPM | OXYGEN SATURATION: 97 % | HEART RATE: 72 BPM | SYSTOLIC BLOOD PRESSURE: 132 MMHG | BODY MASS INDEX: 38.58 KG/M2 | HEIGHT: 64 IN | TEMPERATURE: 97 F | WEIGHT: 226 LBS

## 2022-10-18 DIAGNOSIS — R14.0 BLOATING: Primary | ICD-10-CM

## 2022-10-18 DIAGNOSIS — Z12.11 ENCOUNTER FOR SCREENING COLONOSCOPY: ICD-10-CM

## 2022-10-18 PROCEDURE — 99213 OFFICE O/P EST LOW 20 MIN: CPT | Performed by: NURSE PRACTITIONER

## 2022-10-18 PROCEDURE — 3078F DIAST BP <80 MM HG: CPT | Performed by: NURSE PRACTITIONER

## 2022-10-18 PROCEDURE — 3075F SYST BP GE 130 - 139MM HG: CPT | Performed by: NURSE PRACTITIONER

## 2022-10-18 PROCEDURE — 3008F BODY MASS INDEX DOCD: CPT | Performed by: NURSE PRACTITIONER

## 2022-10-18 NOTE — PATIENT INSTRUCTIONS
Clear liquid diet, avoid dairy and acidic, spicy, and fatty foods. When ready for food try BRAT diet (bananas, rice, applesauce, tea/toast) and crackers. Follow up if symptoms persist or increase (fever, blood in your stool, dark black/sticky stools, or severe abdominal pain).       Kefir yogurt     Stop milk shakes     Gas-X (simethacone)  -  Over the counter    Lactaid tablets with dairy     Follow up for colonoscopy

## 2024-08-15 ENCOUNTER — TELEPHONE (OUTPATIENT)
Dept: FAMILY MEDICINE CLINIC | Facility: CLINIC | Age: 64
End: 2024-08-15

## 2024-08-15 DIAGNOSIS — Z12.31 SCREENING MAMMOGRAM, ENCOUNTER FOR: Primary | ICD-10-CM

## 2024-08-15 DIAGNOSIS — Z13.29 SCREENING FOR THYROID DISORDER: ICD-10-CM

## 2024-08-15 DIAGNOSIS — Z00.00 LABORATORY EXAMINATION ORDERED AS PART OF A ROUTINE GENERAL MEDICAL EXAMINATION: ICD-10-CM

## 2024-08-15 DIAGNOSIS — Z13.6 SCREENING FOR CARDIOVASCULAR CONDITION: ICD-10-CM

## 2024-08-15 DIAGNOSIS — Z12.31 VISIT FOR SCREENING MAMMOGRAM: Primary | ICD-10-CM

## 2024-08-15 DIAGNOSIS — Z11.59 ENCOUNTER FOR HEPATITIS C SCREENING TEST FOR LOW RISK PATIENT: ICD-10-CM

## 2024-08-15 DIAGNOSIS — Z13.1 SCREENING FOR DIABETES MELLITUS: ICD-10-CM

## 2024-08-15 DIAGNOSIS — Z13.0 SCREENING FOR IRON DEFICIENCY ANEMIA: ICD-10-CM

## 2024-08-15 NOTE — TELEPHONE ENCOUNTER
1. Visit for screening mammogram (Primary)  -     Mammogram Bilateral Screening with 3D; Future; Expected date: 08/15/2024  2. Screening for diabetes mellitus  -     Comp Metabolic Panel (14); Future; Expected date: 08/15/2024  3. Screening for iron deficiency anemia  -     CBC With Differential With Platelet; Future; Expected date: 08/15/2024  4. Screening for thyroid disorder  -     TSH W Reflex To Free T4; Future; Expected date: 08/15/2024  5. Screening for cardiovascular condition  -     Lipid Panel; Future; Expected date: 08/15/2024  6. Encounter for hepatitis C screening test for low risk patient  -     HCV Antibody; Future; Expected date: 08/15/2024  7. Laboratory examination ordered as part of a routine general medical examination  -     TSH W Reflex To Free T4; Future; Expected date: 08/15/2024  -     Lipid Panel; Future; Expected date: 08/15/2024  -     CBC With Differential With Platelet; Future; Expected date: 08/15/2024  -     Comp Metabolic Panel (14); Future; Expected date: 08/15/2024       OK to notify. Thanks, Nikhil Salinas MD

## 2024-08-15 NOTE — TELEPHONE ENCOUNTER
Patient is requesting an order for her annual screening mammogram.    Patient has been notified to allow 2-3 business days for order placement. Reviewed with patient that she may schedule mammogram via [employer] or by calling Central Scheduling at that time.    Request for mammogram order routed to triage.

## 2024-08-15 NOTE — TELEPHONE ENCOUNTER
Called and talked to patient told her of pending labs and mammogram. Reminded her to fast 10 hours and stay hydrated.

## 2024-08-29 ENCOUNTER — HOSPITAL ENCOUNTER (OUTPATIENT)
Dept: MAMMOGRAPHY | Facility: HOSPITAL | Age: 64
Discharge: HOME | End: 2024-08-29
Attending: FAMILY MEDICINE
Payer: COMMERCIAL

## 2024-08-29 DIAGNOSIS — Z12.31 VISIT FOR SCREENING MAMMOGRAM: ICD-10-CM

## 2024-08-29 PROCEDURE — 77063 BREAST TOMOSYNTHESIS BI: CPT | Performed by: FAMILY MEDICINE

## 2024-08-29 PROCEDURE — 77067 SCR MAMMO BI INCL CAD: CPT | Performed by: FAMILY MEDICINE

## 2024-09-07 ENCOUNTER — LAB ENCOUNTER (OUTPATIENT)
Dept: LAB | Facility: HOSPITAL | Age: 64
End: 2024-09-07
Attending: FAMILY MEDICINE
Payer: COMMERCIAL

## 2024-09-07 DIAGNOSIS — Z11.59 ENCOUNTER FOR HEPATITIS C SCREENING TEST FOR LOW RISK PATIENT: ICD-10-CM

## 2024-09-07 DIAGNOSIS — Z13.0 SCREENING FOR IRON DEFICIENCY ANEMIA: ICD-10-CM

## 2024-09-07 DIAGNOSIS — Z13.1 SCREENING FOR DIABETES MELLITUS: ICD-10-CM

## 2024-09-07 DIAGNOSIS — Z13.6 SCREENING FOR CARDIOVASCULAR CONDITION: ICD-10-CM

## 2024-09-07 DIAGNOSIS — Z00.00 LABORATORY EXAMINATION ORDERED AS PART OF A ROUTINE GENERAL MEDICAL EXAMINATION: ICD-10-CM

## 2024-09-07 DIAGNOSIS — Z13.29 SCREENING FOR THYROID DISORDER: ICD-10-CM

## 2024-09-07 LAB
ALBUMIN SERPL-MCNC: 4.2 G/DL (ref 3.2–4.8)
ALBUMIN/GLOB SERPL: 1.5 {RATIO} (ref 1–2)
ALP LIVER SERPL-CCNC: 91 U/L
ALT SERPL-CCNC: 12 U/L
ANION GAP SERPL CALC-SCNC: 5 MMOL/L (ref 0–18)
AST SERPL-CCNC: 18 U/L (ref ?–34)
BASOPHILS # BLD AUTO: 0.1 X10(3) UL (ref 0–0.2)
BASOPHILS NFR BLD AUTO: 2.4 %
BILIRUB SERPL-MCNC: 0.6 MG/DL (ref 0.2–1.1)
BUN BLD-MCNC: 12 MG/DL (ref 9–23)
CALCIUM BLD-MCNC: 9.6 MG/DL (ref 8.7–10.4)
CHLORIDE SERPL-SCNC: 110 MMOL/L (ref 98–112)
CHOLEST SERPL-MCNC: 199 MG/DL (ref ?–200)
CO2 SERPL-SCNC: 26 MMOL/L (ref 21–32)
CREAT BLD-MCNC: 0.7 MG/DL
EGFRCR SERPLBLD CKD-EPI 2021: 98 ML/MIN/1.73M2 (ref 60–?)
EOSINOPHIL # BLD AUTO: 0.19 X10(3) UL (ref 0–0.7)
EOSINOPHIL NFR BLD AUTO: 4.5 %
ERYTHROCYTE [DISTWIDTH] IN BLOOD BY AUTOMATED COUNT: 12.7 %
FASTING PATIENT LIPID ANSWER: YES
FASTING STATUS PATIENT QL REPORTED: YES
GLOBULIN PLAS-MCNC: 2.8 G/DL (ref 2–3.5)
GLUCOSE BLD-MCNC: 89 MG/DL (ref 70–99)
HCT VFR BLD AUTO: 39.9 %
HCV AB SERPL QL IA: NONREACTIVE
HDLC SERPL-MCNC: 62 MG/DL (ref 40–59)
HGB BLD-MCNC: 13.4 G/DL
IMM GRANULOCYTES # BLD AUTO: 0.01 X10(3) UL (ref 0–1)
IMM GRANULOCYTES NFR BLD: 0.2 %
LDLC SERPL CALC-MCNC: 120 MG/DL (ref ?–100)
LYMPHOCYTES # BLD AUTO: 1.19 X10(3) UL (ref 1–4)
LYMPHOCYTES NFR BLD AUTO: 28.1 %
MCH RBC QN AUTO: 32.1 PG (ref 26–34)
MCHC RBC AUTO-ENTMCNC: 33.6 G/DL (ref 31–37)
MCV RBC AUTO: 95.7 FL
MONOCYTES # BLD AUTO: 0.36 X10(3) UL (ref 0.1–1)
MONOCYTES NFR BLD AUTO: 8.5 %
NEUTROPHILS # BLD AUTO: 2.39 X10 (3) UL (ref 1.5–7.7)
NEUTROPHILS # BLD AUTO: 2.39 X10(3) UL (ref 1.5–7.7)
NEUTROPHILS NFR BLD AUTO: 56.3 %
NONHDLC SERPL-MCNC: 137 MG/DL (ref ?–130)
OSMOLALITY SERPL CALC.SUM OF ELEC: 291 MOSM/KG (ref 275–295)
PLATELET # BLD AUTO: 201 10(3)UL (ref 150–450)
POTASSIUM SERPL-SCNC: 5.1 MMOL/L (ref 3.5–5.1)
PROT SERPL-MCNC: 7 G/DL (ref 5.7–8.2)
RBC # BLD AUTO: 4.17 X10(6)UL
SODIUM SERPL-SCNC: 141 MMOL/L (ref 136–145)
TRIGL SERPL-MCNC: 93 MG/DL (ref 30–149)
TSI SER-ACNC: 1.04 MIU/ML (ref 0.55–4.78)
VLDLC SERPL CALC-MCNC: 16 MG/DL (ref 0–30)
WBC # BLD AUTO: 4.2 X10(3) UL (ref 4–11)

## 2024-09-07 PROCEDURE — 36415 COLL VENOUS BLD VENIPUNCTURE: CPT

## 2024-09-07 PROCEDURE — 85025 COMPLETE CBC W/AUTO DIFF WBC: CPT

## 2024-09-07 PROCEDURE — 86803 HEPATITIS C AB TEST: CPT

## 2024-09-07 PROCEDURE — 84443 ASSAY THYROID STIM HORMONE: CPT

## 2024-09-07 PROCEDURE — 80053 COMPREHEN METABOLIC PANEL: CPT

## 2024-09-07 PROCEDURE — 80061 LIPID PANEL: CPT

## 2024-09-09 PROBLEM — Z17.0 BILATERAL MALIGNANT NEOPLASM OF BREAST IN FEMALE, ESTROGEN RECEPTOR POSITIVE (HCC): Status: RESOLVED | Noted: 2020-06-22 | Resolved: 2024-09-09

## 2024-09-09 PROBLEM — C50.911 BILATERAL MALIGNANT NEOPLASM OF BREAST IN FEMALE, ESTROGEN RECEPTOR POSITIVE (HCC): Status: RESOLVED | Noted: 2020-06-22 | Resolved: 2024-09-09

## 2024-09-09 PROBLEM — C50.912 BILATERAL MALIGNANT NEOPLASM OF BREAST IN FEMALE, ESTROGEN RECEPTOR POSITIVE (HCC): Status: RESOLVED | Noted: 2020-06-22 | Resolved: 2024-09-09

## 2024-09-16 ENCOUNTER — OFFICE VISIT (OUTPATIENT)
Dept: FAMILY MEDICINE CLINIC | Facility: CLINIC | Age: 64
End: 2024-09-16
Payer: COMMERCIAL

## 2024-09-16 VITALS
DIASTOLIC BLOOD PRESSURE: 74 MMHG | HEIGHT: 64 IN | WEIGHT: 220.81 LBS | HEART RATE: 72 BPM | SYSTOLIC BLOOD PRESSURE: 132 MMHG | BODY MASS INDEX: 37.7 KG/M2 | RESPIRATION RATE: 14 BRPM

## 2024-09-16 DIAGNOSIS — Z00.00 ANNUAL PHYSICAL EXAM: Primary | ICD-10-CM

## 2024-09-16 DIAGNOSIS — R09.82 POST-NASAL DRAINAGE: ICD-10-CM

## 2024-09-16 DIAGNOSIS — Z23 NEED FOR VACCINATION: ICD-10-CM

## 2024-09-16 DIAGNOSIS — F17.210 CIGARETTE SMOKER: ICD-10-CM

## 2024-09-16 DIAGNOSIS — Z71.6 ENCOUNTER FOR TOBACCO USE CESSATION COUNSELING: ICD-10-CM

## 2024-09-16 DIAGNOSIS — Z12.11 SCREENING FOR COLON CANCER: ICD-10-CM

## 2024-09-16 DIAGNOSIS — Z12.2 SCREENING FOR LUNG CANCER: ICD-10-CM

## 2024-09-16 DIAGNOSIS — E66.01 SEVERE OBESITY (BMI 35.0-35.9 WITH COMORBIDITY) (HCC): ICD-10-CM

## 2024-09-16 DIAGNOSIS — J41.0 SIMPLE CHRONIC BRONCHITIS (HCC): ICD-10-CM

## 2024-09-16 RX ORDER — PROMETHAZINE HYDROCHLORIDE AND CODEINE PHOSPHATE 6.25; 1 MG/5ML; MG/5ML
5 SYRUP ORAL EVERY 4 HOURS PRN
Qty: 118 ML | Refills: 0 | Status: SHIPPED | OUTPATIENT
Start: 2024-09-16

## 2024-09-16 RX ORDER — AZITHROMYCIN 250 MG/1
TABLET, FILM COATED ORAL
Qty: 6 TABLET | Refills: 0 | Status: SHIPPED | OUTPATIENT
Start: 2024-09-16 | End: 2024-09-21

## 2024-09-16 RX ORDER — IBUPROFEN 600 MG/1
600 TABLET, FILM COATED ORAL EVERY 6 HOURS PRN
Qty: 270 TABLET | Refills: 1 | Status: SHIPPED | OUTPATIENT
Start: 2024-09-16

## 2024-09-16 NOTE — PROGRESS NOTES
Ruth Wilson is a 63 year old female who presents for a complete physical exam.     had concerns including Physical (WWE, refused to do pap ).   Her last annual assessment has been over 1 year: Annual Physical due on 12/15/2022       Subjective:     Symptoms: is menopausal. Patient complains of dogin well, not ready for PAP so encouraged follow up in fall.   Ibuprofen at times after slip on ice last winter. .   Tobacco:  Social History     Tobacco Use   Smoking Status Every Day    Current packs/day: 0.50    Average packs/day: 0.5 packs/day for 45.8 years (22.9 ttl pk-yrs)    Types: Cigarettes    Start date: 11/21/1978   Smokeless Tobacco Never   Tobacco Comments    less than a half a pack     E-Cigarettes/Vaping       Questions Responses    E-Cigarette Use Never User           Tobacco cessation counseling for <3 minutes.     Ruth Wilson and I had a face to face shared decision making session discussing the risks and benefits of lung cancer screening     Wt Readings from Last 4 Encounters:   09/16/24 220 lb 12.8 oz (100.2 kg)   10/18/22 226 lb (102.5 kg)   12/15/21 228 lb (103.4 kg)   11/16/21 180 lb (81.6 kg)     Body mass index is 37.9 kg/m².     The 10-year ASCVD risk score (Sonny TINEO, et al., 2019) is: 7.3%    Values used to calculate the score:      Age: 63 years      Sex: Female      Is Non- : No      Diabetic: No      Tobacco smoker: Yes      Systolic Blood Pressure: 132 mmHg      Is BP treated: No      HDL Cholesterol: 62 mg/dL      Total Cholesterol: 199 mg/dL    Chief Complaint Reviewed and Verified  Nursing Notes Reviewed and   Verified  Tobacco Reviewed  Allergies Reviewed  Medications Reviewed    Problem List Reviewed  Medical History Reviewed  Surgical History   Reviewed  OB Status Reviewed  Family History Reviewed          Her family history includes Breast Cancer in her maternal aunt; Breast Cancer (age of onset: 57) in her self; Breast Cancer (age of onset: 60) in  her mother; DCIS in her self.   She  reports that she has been smoking cigarettes. She started smoking about 45 years ago. She has a 22.9 pack-year smoking history. She has never used smokeless tobacco. She reports that she does not drink alcohol and does not use drugs.    Exercise: three times per week.  Diet: watches minimally    GYNE HISTORY: Menstrual History:  OB History    Para Term  AB Living   0 0 0 0 0 0   SAB IAB Ectopic Multiple Live Births   0 0 0 0 0      Menarche age:    No LMP recorded (lmp unknown). Patient is postmenopausal.       Health Maintenance Due   Topic Date Due    Colorectal Cancer Screening  Never done    Zoster Vaccines (1 of 2) Never done    Lung Cancer Screening  Never done    Pap Smear  10/29/2022    Annual Physical  12/15/2022    Pneumococcal Vaccine: Birth to 64yrs (2 of 2 - PCV) 12/15/2022    Annual Depression Screening  2024    Tobacco Cessation Counseling  Never done    COVID-19 Vaccine (3 -  season) 2024         Review of Systems   Constitutional: Negative.  Negative for activity change, appetite change, chills and fever.   HENT: Negative.     Eyes: Negative.    Respiratory: Negative.  Negative for shortness of breath.    Cardiovascular: Negative.  Negative for chest pain and palpitations.   Gastrointestinal: Negative.  Negative for abdominal pain.   Genitourinary: Negative.  Negative for dysuria.   Musculoskeletal:  Negative for arthralgias.   Skin: Negative.  Negative for rash.   Allergic/Immunologic: Negative.    Neurological: Negative.         Results:    Lab Results   Component Value Date/Time    WBC 4.2 2024 07:37 AM    HGB 13.4 2024 07:37 AM    .0 2024 07:37 AM      Lab Results   Component Value Date/Time    GLU 89 2024 07:37 AM     2024 07:37 AM    K 5.1 2024 07:37 AM     2024 07:37 AM    CO2 26.0 2024 07:37 AM    CREATSERUM 0.70 2024 07:37 AM    CA 9.6 2024  07:37 AM    ALB 4.2 09/07/2024 07:37 AM    TP 7.0 09/07/2024 07:37 AM    ALKPHO 91 09/07/2024 07:37 AM    AST 18 09/07/2024 07:37 AM    ALT 12 09/07/2024 07:37 AM    BILT 0.6 09/07/2024 07:37 AM    TSH 1.042 09/07/2024 07:37 AM        Lab Results   Component Value Date/Time    CHOLEST 199 09/07/2024 07:37 AM    HDL 62 (H) 09/07/2024 07:37 AM    TRIG 93 09/07/2024 07:37 AM     (H) 09/07/2024 07:37 AM    NONHDLC 137 (H) 09/07/2024 07:37 AM       Last A1c value was  % done  .     Vitamin D:     No results found for: \"VITD\"       Objective:    EXAM:  /74   Pulse 72   Resp 14   Ht 5' 4\" (1.626 m)   Wt 220 lb 12.8 oz (100.2 kg)   LMP  (LMP Unknown)   BMI 37.90 kg/m²  Estimated body mass index is 37.9 kg/m² as calculated from the following:    Height as of this encounter: 5' 4\" (1.626 m).    Weight as of this encounter: 220 lb 12.8 oz (100.2 kg).   Physical Exam  Vitals and nursing note reviewed.   Constitutional:       General: She is not in acute distress.     Appearance: Normal appearance.   HENT:      Head: Normocephalic and atraumatic.      Right Ear: Tympanic membrane and external ear normal.      Left Ear: Tympanic membrane and external ear normal.      Nose: Nose normal.      Mouth/Throat:      Mouth: Mucous membranes are moist.   Eyes:      Extraocular Movements: Extraocular movements intact.      Pupils: Pupils are equal, round, and reactive to light.   Cardiovascular:      Rate and Rhythm: Normal rate and regular rhythm.      Pulses: Normal pulses.           Carotid pulses are 2+ on the right side and 2+ on the left side.       Radial pulses are 2+ on the right side and 2+ on the left side.        Dorsalis pedis pulses are 2+ on the right side and 2+ on the left side.        Posterior tibial pulses are 2+ on the right side and 2+ on the left side.      Heart sounds: Normal heart sounds, S1 normal and S2 normal. No murmur heard.  Pulmonary:      Effort: Pulmonary effort is normal.      Breath  sounds: Normal breath sounds.   Abdominal:      General: Abdomen is flat. Bowel sounds are normal. There is no distension.      Palpations: Abdomen is soft.   Musculoskeletal:         General: Normal range of motion.      Cervical back: Normal range of motion and neck supple.      Right lower leg: No edema.      Left lower leg: No edema.   Skin:     General: Skin is warm and dry.      Capillary Refill: Capillary refill takes less than 2 seconds.   Neurological:      General: No focal deficit present.      Mental Status: She is alert and oriented to person, place, and time.   Psychiatric:         Mood and Affect: Mood normal.         Behavior: Behavior normal.         Thought Content: Thought content normal.          Assessment & Plan:    Ruth Wilson is a 63 year old female who presents for a complete physical exam.   Pt's weight is Body mass index is 37.9 kg/m²., recommended low fat diet and aerobic exercise 30 minutes three times weekly.   Health maintenance, Up to date    Immunizations: Up to date   Immunization History   Administered Date(s) Administered    Albuterol Unit Dose, Per 1mg, Nebu 04/12/2013    Covid-19 Vaccine Pfizer 30 mcg/0.3 ml 04/13/2021, 05/04/2021, 01/25/2022    Pneumococcal Conjugate PCV20 09/16/2024    Pneumovax 23 12/15/2021    TDAP 12/15/2021    Zoster Vaccine Recombinant Adjuvanted (Shingrix) 09/16/2024   Pended Date(s) Pended    Albuterol Sulfate solution 0.083% 2.5 mg-3ml 09/22/2013         Pt info given for: exercise, low fat diet, The patient indicates understanding of these issues and agrees to the plan.  The patient is asked to return for CPX in 1 years.    Assessment:  1. Annual physical exam (Primary)  2. Severe obesity (BMI 35.0-35.9 with comorbidity) ([facility])  Overview:  BMI 39, working on weight loss, tobacco use as complication  Assessment & Plan:    She was counseled on diet and exercise for elevated BMI which is affecting her  smoking hx .   3. Cigarette smoker  Overview:  20 +  PPD x 40 years  Assessment & Plan:  Working on quitting at risk of COPD, not samantha to stop. OK for CT lung screening lung cancer.   Orders:  -     Initial CT Lung Cancer screening (CPT=71271)- Please Answer Questions, Pack years and Smoking status; Future; Expected date: 09/16/2024  -     Shared Decision Making visit for Medicare for Lung Cancer CT screening  4. Post-nasal drainage  -     Promethazine-Codeine; Take 5 mL by mouth every 4 (four) hours as needed for cough.  Dispense: 118 mL; Refill: 0  5. Encounter for tobacco use cessation counseling  -     Tobacco Cessation Discussion  6. Screening for lung cancer  -     Initial CT Lung Cancer screening (CPT=71271)- Please Answer Questions, Pack years and Smoking status; Future; Expected date: 09/16/2024  -     Shared Decision Making visit for Medicare for Lung Cancer CT screening  7. Need for vaccination  -     Shingrix 1st Dose (Today)  -     Shingrix Vaccine 2nd Dose; Future; Expected date: 11/16/2024  8. Screening for colon cancer  -     Referral to [facility] for Colonoscopy **Select a Doc to add to AVS**  9. Simple chronic bronchitis (HCC)  Overview:  30+ pack years. Yearly bronchitis with inhalers  Assessment & Plan:  Disused stopping tobacco and prevention. PFT in future but antibiotic and cough meds today. CT lung as well.   Other orders  -     Azithromycin; Take 2 tablets (500 mg total) by mouth daily for 1 day, THEN 1 tablet (250 mg total) daily for 4 days.  Dispense: 6 tablet; Refill: 0  -     PCV20 (Prevnar 20)  -     Ibuprofen; Take 1 tablet (600 mg total) by mouth every 6 (six) hours as needed for Pain.  Dispense: 270 tablet; Refill: 1     I am having Ruth Wilson start on ibuprofen. I am also having her maintain her azithromycin and promethazine-codeine.     Return in about 3 months (around 12/16/2024) for Pap with Suresh this fall.      Initial Lung Cancer Screening: Shared Decision Making    Ruth Wilson was seen in the office today for review of  eligibility for, as well as, discussion of CT lung screening testing.     I reviewed and confirmed that the patient meets screening eligibility criteria:  63 year old, absence of signs or symptoms of lung cancer, cigarette smoking history is 30 pack-years OR the patient was a previous smoker and has been quit for 0 years.    I reviewed the risks, harms and benefits of screening, including: false positive rates which may lead to additional testing to further evaluate findings, false negatives, and radiation exposure.  I reviewed the importance of adherence to annual lung cancer screening, the impact of co-morbidities on treatment for a cancer finding, and the patient's willingness to undergo diagnosis and treatment if there is a positive finding.    I reviewed the importance of smoking cessation and provided information about smoking cessation programs offered at [facility], including classes and the American Lung Association (ALA) Quitline referral program.  The patient was encouraged to contact the Lung Nurse Navigator ([place] -[phone] or [place] - [phone]) for more information regarding smoking cessation and the contact information for the same was provided.    A written order for the exam was given to the patient.    I will contact the patient with the CT lung screening results and provide the follow-up that is needed.    I spent 8 minutes with the patient providing this information.      Ronan Salinas MD

## 2024-10-01 DIAGNOSIS — R09.82 POST-NASAL DRAINAGE: ICD-10-CM

## 2024-10-01 RX ORDER — PROMETHAZINE HYDROCHLORIDE AND CODEINE PHOSPHATE 6.25; 1 MG/5ML; MG/5ML
5 SYRUP ORAL EVERY 4 HOURS PRN
Qty: 118 ML | Refills: 0 | Status: CANCELLED | OUTPATIENT
Start: 2024-10-01

## 2024-10-04 NOTE — TELEPHONE ENCOUNTER
Called and talked to patient she said she did not get better with the antibiotics and would like more cough medication. She told me Dr Salinas said she would give more antibiotic if not better.

## 2024-10-08 RX ORDER — DOXYCYCLINE HYCLATE 100 MG
100 TABLET ORAL 2 TIMES DAILY
Qty: 20 TABLET | Refills: 0 | Status: SHIPPED | OUTPATIENT
Start: 2024-10-08 | End: 2024-10-18

## 2024-10-08 NOTE — TELEPHONE ENCOUNTER
Patient called back and feeling better but she feels now that her left ear is infected she has some PND down her throat causing a cough and pain in left ear and when she puts a Q-tip in it the swab comes out wet I told her to try zyrtec and Delsym but she is wondering if she needs another round of antibiotics. Unable to get off work in order to come in.

## 2024-10-08 NOTE — TELEPHONE ENCOUNTER
Called LMOM to call back to discuss how she is doing and if she needs more cough medication would need to be seen.

## 2024-10-09 NOTE — TELEPHONE ENCOUNTER
Not better on zpack at 2 week ago visit, no new cough med, but ok for doxycycline. Sent to pharmacy, ok to notify. Thanks and stay well, Nikhil Salinas MD

## 2025-01-04 ENCOUNTER — HOSPITAL ENCOUNTER (OUTPATIENT)
Dept: CT IMAGING | Facility: HOSPITAL | Age: 65
Discharge: HOME | End: 2025-01-04
Attending: FAMILY MEDICINE
Payer: COMMERCIAL

## 2025-01-04 DIAGNOSIS — Z12.2 SCREENING FOR LUNG CANCER: ICD-10-CM

## 2025-01-04 DIAGNOSIS — F17.210 CIGARETTE SMOKER: ICD-10-CM

## 2025-01-04 PROCEDURE — 71271 CT THORAX LUNG CANCER SCR C-: CPT | Performed by: FAMILY MEDICINE

## 2025-04-16 ENCOUNTER — TELEPHONE (OUTPATIENT)
Dept: FAMILY MEDICINE CLINIC | Facility: CLINIC | Age: 65
End: 2025-04-16

## 2025-04-16 DIAGNOSIS — Z12.31 VISIT FOR SCREENING MAMMOGRAM: Primary | ICD-10-CM

## 2025-04-16 NOTE — TELEPHONE ENCOUNTER
Patient is requesting an order for her annual screening mammogram.    Patient has been notified to allow 2-3 business days for order placement. Reviewed with patient that she may schedule mammogram via [employer] or by calling Central Scheduling at that time.    Request for mammogram order routed to triage.

## 2025-05-05 RX ORDER — IBUPROFEN 600 MG/1
600 TABLET, FILM COATED ORAL EVERY 6 HOURS PRN
Qty: 270 TABLET | Refills: 1 | Status: SHIPPED | OUTPATIENT
Start: 2025-05-05

## 2025-05-06 ENCOUNTER — OFFICE VISIT (OUTPATIENT)
Dept: FAMILY MEDICINE CLINIC | Facility: CLINIC | Age: 65
End: 2025-05-06
Payer: COMMERCIAL

## 2025-05-06 VITALS
HEART RATE: 100 BPM | OXYGEN SATURATION: 97 % | TEMPERATURE: 98 F | SYSTOLIC BLOOD PRESSURE: 128 MMHG | WEIGHT: 221 LBS | DIASTOLIC BLOOD PRESSURE: 80 MMHG | RESPIRATION RATE: 16 BRPM | BODY MASS INDEX: 38 KG/M2

## 2025-05-06 DIAGNOSIS — J41.0 SIMPLE CHRONIC BRONCHITIS (HCC): Primary | ICD-10-CM

## 2025-05-06 DIAGNOSIS — R09.82 POST-NASAL DRAINAGE: ICD-10-CM

## 2025-05-06 PROCEDURE — 3079F DIAST BP 80-89 MM HG: CPT | Performed by: PHYSICIAN ASSISTANT

## 2025-05-06 PROCEDURE — 99214 OFFICE O/P EST MOD 30 MIN: CPT | Performed by: PHYSICIAN ASSISTANT

## 2025-05-06 PROCEDURE — 3074F SYST BP LT 130 MM HG: CPT | Performed by: PHYSICIAN ASSISTANT

## 2025-05-06 RX ORDER — ALBUTEROL SULFATE 90 UG/1
2 INHALANT RESPIRATORY (INHALATION) EVERY 6 HOURS PRN
Qty: 6.7 G | Refills: 0 | Status: SHIPPED | OUTPATIENT
Start: 2025-05-06

## 2025-05-06 RX ORDER — PROMETHAZINE HYDROCHLORIDE AND CODEINE PHOSPHATE 6.25; 1 MG/5ML; MG/5ML
5 SYRUP ORAL EVERY 4 HOURS PRN
Qty: 118 ML | Refills: 0 | Status: SHIPPED | OUTPATIENT
Start: 2025-05-06

## 2025-05-06 RX ORDER — SODIUM FLUORIDE AND POTASSIUM NITRATE 5.8; 57.5 MG/ML; MG/ML
GEL, DENTIFRICE DENTAL
COMMUNITY
Start: 2025-04-28

## 2025-05-06 RX ORDER — AZITHROMYCIN 250 MG/1
TABLET, FILM COATED ORAL
Qty: 6 TABLET | Refills: 0 | Status: SHIPPED | OUTPATIENT
Start: 2025-05-06 | End: 2025-05-11

## 2025-05-06 NOTE — PROGRESS NOTES
The following individual(s) verbally consented to be recorded using ambient AI listening technology and understand that they can each withdraw their consent to this listening technology at any point by asking the clinician to turn off or pause the recording:    Patient name: Ruth Yanceyr

## 2025-05-13 NOTE — PROGRESS NOTES
Subjective:   Ruth Wilson is a 64 year old female who presents for Cough       History/Other:   History of Present Illness  Ruth Wilson is a 64 year old female with chronic bronchitis who presents with a persistent cough and runny nose.    She has been experiencing a persistent cough and rhinorrhea since the weekend. The cough is described as hard and worsens when lying down. She has a history of chronic bronchitis and has experienced similar symptoms in the past, but is unsure if this episode is different from previous ones.    She denies any recent use of inhalers or nebulizers, despite having a history of wheezing. She mentions never having used an inhaler. Pulmonary function testing was previously recommended but not performed.    She has been taking azithromycin frequently, approximately once a week for five weeks, due to dental issues, including abscesses and a root canal. The dental infections have resolved, and she is not currently concerned about her teeth.    No environmental allergies are reported, and there have been no recent pneumonias. She mentions using cough syrup in the past, which she finds helpful in bringing up mucus.     Chief Complaint Reviewed and Verified  No Further Nursing Notes to   Review  Tobacco Reviewed  Allergies Reviewed  Medications Reviewed  OB   Status Reviewed         Tobacco:  Tobacco Use[1]  E-Cigarettes/Vaping       Questions Responses    E-Cigarette Use Never User           Tobacco cessation counseling for <3 minutes.      Current Medications[2]      Objective:   /80   Pulse 100   Temp 98.2 °F (36.8 °C)   Resp 16   Wt 221 lb (100.2 kg)   LMP  (LMP Unknown)   SpO2 97%   BMI 37.93 kg/m²  Estimated body mass index is 37.93 kg/m² as calculated from the following:    Height as of 9/16/24: 5' 4\" (1.626 m).    Weight as of this encounter: 221 lb (100.2 kg).  Results  RADIOLOGY  Chest CT: Screening, unremarkable     Physical Exam  GENERAL: Alert, cooperative,  well developed, no acute distress.  HEENT: Normocephalic, normal oropharynx, moist mucous membranes, cerumen in left ear, right ear clear.  CHEST: Wheezing present in lungs throughout.  CARDIOVASCULAR: Normal heart rate and rhythm, S1 and S2 normal without murmurs.      Assessment & Plan:   1. Simple chronic bronchitis (HCC) (Primary)  Overview:  30+ pack years. Yearly bronchitis with inhalers  Orders:  -     Pulmonary Function Test; Future; Expected date: 05/06/2025  2. Post-nasal drainage  -     Promethazine-Codeine; Take 5 mL by mouth every 4 (four) hours as needed for cough.  Dispense: 118 mL; Refill: 0  Other orders  -     Albuterol Sulfate HFA; Inhale 2 puffs into the lungs every 6 (six) hours as needed.  Dispense: 6.7 g; Refill: 0  -     Azithromycin; Take 2 tablets (500 mg total) by mouth daily for 1 day, THEN 1 tablet (250 mg total) daily for 4 days.  Dispense: 6 tablet; Refill: 0    Assessment & Plan  Chronic bronchitis with wheezing  Chronic bronchitis with significant wheezing, indicating airway inflammation. She has not used inhalers or nebulizers previously. Antibiotics are unlikely to address wheezing if due to inflammation rather than infection.  - Prescribe albuterol inhaler for use up to four times daily, especially in the morning and before bed, to open airways and improve breathing.  - Discuss GoodRx for cost savings on inhaler.  - Emphasize importance of using inhaler at symptom onset to prevent exacerbation.  - Order pulmonary function test to assess breathing capacity.  - Declines albuterol neb in office    Cough with runny nose  Acute cough with runny nose since the weekend, with a hard cough especially when lying down. Azithromycin (Z-Chase) prescribed for cough, though it does not cover dental infections. Antibiotics have limited efficacy if cough is due to airway inflammation rather than infection.  - Prescribe azithromycin (Z-Chase).  - Prescribe ProMeth cough syrup for symptomatic relief at  patient request.         MARK Jenkins, 5/6/2025    Total time 30 min, 20 min with patient 5 min documentation, 5 min chart review           [1]   Social History  Tobacco Use   Smoking Status Every Day    Current packs/day: 0.50    Average packs/day: 0.5 packs/day for 46.5 years (23.2 ttl pk-yrs)    Types: Cigarettes    Start date: 11/21/1978   Smokeless Tobacco Never   Tobacco Comments    less than a half a pack   [2]   Current Outpatient Medications   Medication Sig Dispense Refill    PREVIDENT 5000 SENSITIVE 1.1-5 % Dental Gel USE AS TOOTHPASTE AND BRUSH FOR 2 MINUTES THEN SPIT OUT AFTER BREAKFAST AND BEFORE SLEEPING DO NOT RINSE THE MOUTH AFTER SPITTING.      albuterol 108 (90 Base) MCG/ACT Inhalation Aero Soln Inhale 2 puffs into the lungs every 6 (six) hours as needed. 6.7 g 0    promethazine-codeine 6.25-10 MG/5ML Oral Syrup Take 5 mL by mouth every 4 (four) hours as needed for cough. 118 mL 0    IBUPROFEN 600 MG Oral Tab Take 1 tablet (600 mg total) by mouth every 6 (six) hours as needed for Pain. 270 tablet 1

## 2025-07-01 ENCOUNTER — PATIENT OUTREACH (OUTPATIENT)
Dept: FAMILY MEDICINE CLINIC | Facility: CLINIC | Age: 65
End: 2025-07-01

## (undated) DEVICE — DRAPE,TAPE STRIPS,STERILE: Brand: MEDLINE

## (undated) DEVICE — CLEAR MONOFILAMENT (POLYDIOXANONE), ABSORBABLE SURGICAL SUTURE: Brand: PDS

## (undated) DEVICE — SOL  .9 1000ML BTL

## (undated) DEVICE — SUTURE VICRYL 3-0 SH

## (undated) DEVICE — CAUTERY BLADE 2IN INS E1455

## (undated) DEVICE — STERILE (15.2 X 244CM) POLYETHYLENE TELESCOPICALLY-FOLDED COVER WITH ATTACHED (3CM) NEOGUARD™ TIP: Brand: SURGI-TIP TRANSDUCER COVER

## (undated) DEVICE — DRAPE PACK CHEST & U BAR

## (undated) DEVICE — 3M™ STERI-DRAPE™ INSTRUMENT POUCH 1018: Brand: STERI-DRAPE™

## (undated) DEVICE — SUTURE SILK 2-0 FS

## (undated) DEVICE — TOWEL OR BLU 16X26 STRL

## (undated) DEVICE — KENDALL SCD EXPRESS SLEEVES, KNEE LENGTH, MEDIUM: Brand: KENDALL SCD

## (undated) DEVICE — HEMOCLIP HORIZON MED 002200

## (undated) DEVICE — BREAST-HERNIA-PORT CDS-LF: Brand: MEDLINE INDUSTRIES, INC.

## (undated) DEVICE — STANDARD HYPODERMIC NEEDLE,POLYPROPYLENE HUB: Brand: MONOJECT

## (undated) DEVICE — SYRINGE 5ML LL TIP

## (undated) DEVICE — HEMOCLIP HORIZON SM 001200

## (undated) DEVICE — STERILE LATEX POWDER-FREE SURGICAL GLOVES WITH HYDROGEL COATING, SMOOTH FINISH, STRAIGHT FINGER: Brand: PROTEXIS

## (undated) DEVICE — SUTURE MONOCRYL 4-0 PS-2

## (undated) DEVICE — UNDERPAD INCNT 30X30IN PP HVY

## (undated) DEVICE — #15 STERILE STAINLESS BLADE: Brand: STERILE STAINLESS BLADES

## (undated) NOTE — LETTER
[facility]  Phone: [phone]  Right Fax: [phone]  [address] [place] [address] By:  Jerry Grissom RN Date: 1/24/20    Patient Name: [facility]  Surgery Date: 2/6/2020    CSN: [id number]  Medical Record:  Rev 2/12/14

## (undated) NOTE — LETTER
11/29/19        Krystle Ibrahim  [address]      Dear Danish Pratt,    [facility] records indicate that you have outstanding lab work and or testing that was ordered for you and has not yet been completed:                    XR DEXA B